# Patient Record
Sex: FEMALE | Race: WHITE | NOT HISPANIC OR LATINO | Employment: OTHER | URBAN - METROPOLITAN AREA
[De-identification: names, ages, dates, MRNs, and addresses within clinical notes are randomized per-mention and may not be internally consistent; named-entity substitution may affect disease eponyms.]

---

## 2019-12-12 ENCOUNTER — OFFICE VISIT (OUTPATIENT)
Dept: ORTHOPEDIC SURGERY | Facility: CLINIC | Age: 74
End: 2019-12-12

## 2019-12-12 VITALS — HEIGHT: 65 IN | WEIGHT: 159 LBS | OXYGEN SATURATION: 98 % | HEART RATE: 70 BPM | BODY MASS INDEX: 26.49 KG/M2

## 2019-12-12 DIAGNOSIS — M23.91 LOCKING KNEE, RIGHT: ICD-10-CM

## 2019-12-12 DIAGNOSIS — M17.11 PRIMARY OSTEOARTHRITIS OF RIGHT KNEE: ICD-10-CM

## 2019-12-12 DIAGNOSIS — M25.561 RIGHT KNEE PAIN, UNSPECIFIED CHRONICITY: Primary | ICD-10-CM

## 2019-12-12 PROCEDURE — 99203 OFFICE O/P NEW LOW 30 MIN: CPT | Performed by: ORTHOPAEDIC SURGERY

## 2019-12-12 PROCEDURE — 20610 DRAIN/INJ JOINT/BURSA W/O US: CPT | Performed by: ORTHOPAEDIC SURGERY

## 2019-12-12 RX ORDER — LIDOCAINE HYDROCHLORIDE 10 MG/ML
3 INJECTION, SOLUTION EPIDURAL; INFILTRATION; INTRACAUDAL; PERINEURAL
Status: COMPLETED | OUTPATIENT
Start: 2019-12-12 | End: 2019-12-12

## 2019-12-12 RX ORDER — TRAMADOL HYDROCHLORIDE 50 MG/1
50 TABLET ORAL EVERY 6 HOURS PRN
Qty: 30 TABLET | Refills: 1 | Status: SHIPPED | OUTPATIENT
Start: 2019-12-12 | End: 2020-02-27

## 2019-12-12 RX ORDER — TRIAMCINOLONE ACETONIDE 40 MG/ML
40 INJECTION, SUSPENSION INTRA-ARTICULAR; INTRAMUSCULAR
Status: COMPLETED | OUTPATIENT
Start: 2019-12-12 | End: 2019-12-12

## 2019-12-12 RX ORDER — DORZOLAMIDE HCL 20 MG/ML
SOLUTION/ DROPS OPHTHALMIC
COMMUNITY
Start: 2019-11-15

## 2019-12-12 RX ORDER — PRAVASTATIN SODIUM 20 MG
TABLET ORAL
COMMUNITY
Start: 2019-09-13

## 2019-12-12 RX ORDER — IMIQUIMOD 12.5 MG/.25G
CREAM TOPICAL
COMMUNITY
Start: 2019-12-05 | End: 2020-02-27

## 2019-12-12 RX ADMIN — TRIAMCINOLONE ACETONIDE 40 MG: 40 INJECTION, SUSPENSION INTRA-ARTICULAR; INTRAMUSCULAR at 16:35

## 2019-12-12 RX ADMIN — LIDOCAINE HYDROCHLORIDE 3 ML: 10 INJECTION, SOLUTION EPIDURAL; INFILTRATION; INTRACAUDAL; PERINEURAL at 16:35

## 2019-12-12 NOTE — PROGRESS NOTES
[facility] [place] [facility] Note    Subjective     Pain of the Right Knee (Yesterday she wore a compression sleeve on her knee and now today her knee has gotten worse. She is unable to really walk. Cold compress does not help with pain, but heat does./)      ANUEL    Ruthie Starkey is a 74 y.o. female.  Patient is well-known to me socially.  She has had difficulty with her right knee for quite some time but over the last month or so, she started physical therapy and this is really worsened her knee pain.  She has episodes of locking in the knee.  She is tried a little bit of over-the-counter ibuprofen but it seems to bother her stomach.  She has difficulty walking and this is worsened over the last day or 2.  She lives independently.  She does not use assistive devices to ambulate.  She is here with her daughters in law for further evaluation and treatment.    History reviewed. No pertinent past medical history.   Past Surgical History:   Procedure Laterality Date   • COLONOSCOPY     • SKIN CANCER EXCISION     • TONSILLECTOMY     • TUBAL ABDOMINAL LIGATION     • WISDOM TOOTH EXTRACTION        Family History   Problem Relation Age of Onset   • Hypertension Mother    • Heart disease Mother    • Osteoarthritis Father    • Heart disease Father      Social History     Socioeconomic History   • Marital status: Single     Spouse name: Not on file   • Number of children: Not on file   • Years of education: Not on file   • Highest education level: Not on file   Tobacco Use   • Smoking status: Former Smoker     Years: 20.00     Types: Cigarettes     Last attempt to quit: 2009     Years since quitting: 10.9   • Smokeless tobacco: Never Used   • Tobacco comment: socially    Substance and Sexual Activity   • Alcohol use: Yes     Frequency: Never     Comment: social   • Drug use: Never   • Sexual activity: Defer      Current Outpatient Medications on File Prior to Visit   Medication Sig Dispense Refill   • dorzolamide  "(TRUSOPT) 2 % ophthalmic solution      • imiquimod (ALDARA) 5 % cream      • pravastatin (PRAVACHOL) 20 MG tablet        No current facility-administered medications on file prior to visit.       No Known Allergies     The following portions of the patient's history were reviewed and updated as appropriate: allergies, current medications, past family history, past medical history, past social history, past surgical history and problem list.    Review of Systems   Constitutional: Negative.    HENT: Negative.    Eyes: Negative.    Respiratory: Negative.    Cardiovascular: Negative.    Gastrointestinal: Negative.    Endocrine: Negative.    Genitourinary: Negative.    Musculoskeletal: Positive for arthralgias and joint swelling.   Skin: Negative.    Allergic/Immunologic: Negative.    Neurological: Negative.    Hematological: Negative.    Psychiatric/Behavioral: Negative.         Objective      Physical Exam  Pulse 70   Ht 165.1 cm (65\")   Wt 72.1 kg (159 lb)   SpO2 98%   BMI 26.46 kg/m²     Body mass index is 26.46 kg/m².    General  Mental Status - alert  General Appearance - cooperative, well groomed, not in acute distress  Orientation - Oriented X3  Build & Nutrition - well developed and well nourished  Posture - normal posture  Gait - normal gait     Integumentary  Global Assessment  Examination of related systems reveals - no lymphadenopathy  Ears:  No abnormality  Nose:  No mucous drainage  General Characteristics  Overall examination of the patient's skin reveals - no rashes, no evidence of scars, no suspicious lesions and no bruises.  Color - normal coloration of skin.  Vascular: Brisk capillary refill in all extremities    Ortho Exam  Peripheral Vascular:    Upper Extremity:   Inspection:  Left--no cyanotic nail beds Right--no cyanotic nail beds   Bilateral:  Pink nail beds with brisk capillary refill   Palpation:  Bilateral radial pulse normal    Musculoskeletal:  Global Assessment:  Overall assessment of " Lower Extremity Muscle Strength and Tone:  Right quadriceps--5/5   Right hamstrings--5/5       Right tibialis anterior--5/5  Right gastroc-soleus--5/5  Right EHL --5/5    Lower Extremity:  Knee/Patella:  No digital clubbing or cyanosis.    Examination of left knee reveals:  Normal deep tendon reflexes, coordination, strength, tone, sensation.  No known fractures or deformities.    Inspection and Palpation:  Right knee:  Tenderness:  Over the lateral joint line and moderate severity  Effusion:  1+  Crepitus:  Positive  Pulses:  2+  Ecchymosis:  None  Warmth:  None     ROM:  Right:  Extension:5    Flexion:120  Left:  Extension:5    Flexion:120    Instability:    Right:  Lachman Test:  Negative, Varus stress test negative, Valgus stress test negative    Deformities/Malalignments/Discrepancies:    Left:  No deformity   Right:  Genu valgum    Functional Testing:  Jaun's test:  Negative  Patella grind test:  Positive  Q-angle:  normal    Imaging/Studies  Imaging Results (Last 24 Hours)     Procedure Component Value Units Date/Time    XR Knee 4+ View Right [[id number]] Resulted:  12/12/19 1657     Updated:  12/12/19 1726    Narrative:       Knee X-Ray    Indication: Pain    Study:  Upright AP, Skiers, Lateral, and Sunrise views of Right knee(s)    Comparison: None    Findings:    Patient appears to have mild to early moderate degenerative changes in the   medial compartment.    There are severe degenerative changes in the lateral compartment.    There are severe changes in the patellofemoral compartment.    Patient has overall valgus alignment.      Impression:   Severe lateral compartment and patellofemoral compartment degenerative   changes of the knee              Assessment:  1. Right knee pain, unspecified chronicity    2. Primary osteoarthritis of right knee    3. Locking knee, right        Plan:  1. Continue over-the-counter medication as needed for discomfort  2. Locking right knee in the face of end-stage  lateral patellofemoral compartment arthritis--we have a lengthy discussion with the patient and her 2 daughters in law this afternoon.  Given her acute exacerbation of her underlying end-stage arthritis, I recommended a corticosteroid injection.  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  We could add a Visco supplement as well if she is still not doing well.  We spoke at length about definitive management which would be arthroplasty.  Given her level of independence and motivation, anticipate that she would do well.  We will see how she does over the next few weeks.        Remington Jeong MD  12/12/19  6:30 PM

## 2019-12-12 NOTE — PROGRESS NOTES
Procedure   Large Joint Arthrocentesis: R knee  Date/Time: 12/12/2019 4:35 PM  Consent given by: patient  Site marked: site marked  Timeout: Immediately prior to procedure a time out was called to verify the correct patient, procedure, equipment, support staff and site/side marked as required   Supporting Documentation  Indications: pain   Procedure Details  Location: knee - R knee  Preparation: Patient was prepped and draped in the usual sterile fashion  Needle size: 25 G  Approach: anterolateral  Medications administered: 40 mg triamcinolone acetonide 40 MG/ML; 3 mL lidocaine PF 1% 1 %  Patient tolerance: patient tolerated the procedure well with no immediate complications

## 2020-01-09 ENCOUNTER — OFFICE VISIT (OUTPATIENT)
Dept: ORTHOPEDIC SURGERY | Facility: CLINIC | Age: 75
End: 2020-01-09

## 2020-01-09 ENCOUNTER — PREP FOR SURGERY (OUTPATIENT)
Dept: OTHER | Facility: HOSPITAL | Age: 75
End: 2020-01-09

## 2020-01-09 VITALS — HEART RATE: 67 BPM | OXYGEN SATURATION: 99 % | WEIGHT: 162.2 LBS | BODY MASS INDEX: 27.02 KG/M2 | HEIGHT: 65 IN

## 2020-01-09 DIAGNOSIS — M17.11 PRIMARY OSTEOARTHRITIS OF RIGHT KNEE: Primary | ICD-10-CM

## 2020-01-09 DIAGNOSIS — M23.91 LOCKING KNEE, RIGHT: ICD-10-CM

## 2020-01-09 PROCEDURE — 99213 OFFICE O/P EST LOW 20 MIN: CPT | Performed by: ORTHOPAEDIC SURGERY

## 2020-01-09 RX ORDER — ACETAMINOPHEN 500 MG
1000 TABLET ORAL ONCE
Status: CANCELLED | OUTPATIENT
Start: 2020-01-09 | End: 2020-01-09

## 2020-01-09 RX ORDER — OXYCODONE HCL 10 MG/1
10 TABLET, FILM COATED, EXTENDED RELEASE ORAL ONCE
Status: CANCELLED | OUTPATIENT
Start: 2020-01-09 | End: 2020-01-09

## 2020-01-09 RX ORDER — CEFAZOLIN SODIUM 2 G/100ML
2 INJECTION, SOLUTION INTRAVENOUS ONCE
Status: CANCELLED | OUTPATIENT
Start: 2020-01-09 | End: 2020-01-09

## 2020-01-09 NOTE — PROGRESS NOTES
"    [facility] [place] [facility] Note        Subjective     CC: Follow-up of the Right Knee (4 week f/u/Primary osteoarthritis of right knee )      HPI    Ruthie Starkey is a 74 y.o. female.  Patient is here today with her oldest son for follow-up of her right knee arthritis and locking.  She was injected with corticosteroid and has gotten good relief from the injection but knows that she is yet to get back to her baseline.  The knee is stiff and sore and it limits her.  She is here to discuss arthroplasty.      ROS:    Constiutional:Pt denies fever, chills, nausea, or vomiting.  MSK:as above        Objective      Past Medical History  History reviewed. No pertinent past medical history.      Physical Exam  Pulse 67   Ht 165.1 cm (65\")   Wt 73.6 kg (162 lb 3.2 oz)   SpO2 99%   Breastfeeding No   BMI 26.99 kg/m²     Body mass index is 26.99 kg/m².    Patient is well nourished and well developed.        Ortho Exam  Peripheral Vascular:    Upper Extremity:   Inspection:  Left--no cyanotic nail beds Right--no cyanotic nail beds   Bilateral:  Pink nail beds with brisk capillary refill   Palpation:  Bilateral radial pulse normal    Musculoskeletal:  Global Assessment:  Overall assessment of Lower Extremity Muscle Strength and Tone:  Right quadriceps--5/5   Right hamstrings--5/5       Right tibialis anterior--5/5  Right gastroc-soleus--5/5  Right EHL --5/5    Lower Extremity:  Knee/Patella:  No digital clubbing or cyanosis.    Examination of left knee reveals:  Normal deep tendon reflexes, coordination, strength, tone, sensation.  No known fractures or deformities.    Inspection and Palpation:  Right knee:  Tenderness:  Over the lateral joint line and moderate severity  Effusion:  1+  Crepitus:  Positive  Pulses:  2+  Ecchymosis:  None  Warmth:  None     ROM:  Right:  Extension:5    Flexion:120  Left:  Extension:5    Flexion:120    Instability:    Right:  Lachman Test:  Negative, Varus stress test negative, " Valgus stress test negative    Deformities/Malalignments/Discrepancies:    Left:  No deformity   Right:  Genu valgum    Functional Testing:  Jaun's test:  Negative  Patella grind test:  Positive  Q-angle:  normal    Imaging/Labs/EMG Reviewed:  Imaging Results (Last 24 Hours)     ** No results found for the last 24 hours. **          Assessment    Assessment:  1. Primary osteoarthritis of right knee    2. Locking knee, right        Plan:  1. Recommend over the counter anti-inflammatories for pain and/or swelling  2. Osteoarthritis right knee valgus pattern--we discussed the risk, benefits, and potential hazards of right total knee arthroplasty.  Patient is scheduled to get a tooth extracted sometime at the end of January and early February and we told her she needs to be clear of any infection and off antibiotics for couple of weeks prior to any surgery.  She also needs to be 12 weeks out from her last injection which would be sometime in the middle of March.  Patient would like to go to [facility] for about a week after surgery and this should be feasible given that she lives alone.  Patient will be sent for PAT.  Low risk for DVT given family history and personal history.  We will use aspirin for DVT prophylaxis.      Remington Jeong MD  01/09/20  9:29 AM

## 2020-02-27 ENCOUNTER — APPOINTMENT (OUTPATIENT)
Dept: PREADMISSION TESTING | Facility: HOSPITAL | Age: 75
End: 2020-02-27

## 2020-02-27 VITALS — BODY MASS INDEX: 27.07 KG/M2 | HEIGHT: 65 IN | WEIGHT: 162.48 LBS

## 2020-02-27 DIAGNOSIS — M17.11 PRIMARY OSTEOARTHRITIS OF RIGHT KNEE: ICD-10-CM

## 2020-02-27 DIAGNOSIS — Z01.89 LABORATORY TEST: Primary | ICD-10-CM

## 2020-02-27 LAB
ABO GROUP BLD: NORMAL
ANION GAP SERPL CALCULATED.3IONS-SCNC: 10 MMOL/L (ref 5–15)
BASOPHILS # BLD AUTO: 0.08 10*3/MM3 (ref 0–0.2)
BASOPHILS NFR BLD AUTO: 0.9 % (ref 0–1.5)
BILIRUB UR QL STRIP: NEGATIVE
BUN BLD-MCNC: 16 MG/DL (ref 8–23)
BUN/CREAT SERPL: 28.1 (ref 7–25)
CALCIUM SPEC-SCNC: 9.7 MG/DL (ref 8.6–10.5)
CHLORIDE SERPL-SCNC: 106 MMOL/L (ref 98–107)
CLARITY UR: CLEAR
CO2 SERPL-SCNC: 25 MMOL/L (ref 22–29)
COLOR UR: YELLOW
CREAT BLD-MCNC: 0.57 MG/DL (ref 0.57–1)
CRP SERPL-MCNC: 0.27 MG/DL (ref 0–0.5)
DEPRECATED RDW RBC AUTO: 42.5 FL (ref 37–54)
EOSINOPHIL # BLD AUTO: 0.24 10*3/MM3 (ref 0–0.4)
EOSINOPHIL NFR BLD AUTO: 2.7 % (ref 0.3–6.2)
ERYTHROCYTE [DISTWIDTH] IN BLOOD BY AUTOMATED COUNT: 12.8 % (ref 12.3–15.4)
ERYTHROCYTE [SEDIMENTATION RATE] IN BLOOD: 29 MM/HR (ref 0–30)
GFR SERPL CREATININE-BSD FRML MDRD: 104 ML/MIN/1.73
GLUCOSE BLD-MCNC: 83 MG/DL (ref 65–99)
GLUCOSE UR STRIP-MCNC: NEGATIVE MG/DL
HBA1C MFR BLD: 5.3 % (ref 4.8–5.6)
HCT VFR BLD AUTO: 43.1 % (ref 34–46.6)
HGB BLD-MCNC: 13.6 G/DL (ref 12–15.9)
HGB UR QL STRIP.AUTO: NEGATIVE
IMM GRANULOCYTES # BLD AUTO: 0.03 10*3/MM3 (ref 0–0.05)
IMM GRANULOCYTES NFR BLD AUTO: 0.3 % (ref 0–0.5)
KETONES UR QL STRIP: NEGATIVE
LEUKOCYTE ESTERASE UR QL STRIP.AUTO: NEGATIVE
LYMPHOCYTES # BLD AUTO: 2.39 10*3/MM3 (ref 0.7–3.1)
LYMPHOCYTES NFR BLD AUTO: 26.8 % (ref 19.6–45.3)
MCH RBC QN AUTO: 28.5 PG (ref 26.6–33)
MCHC RBC AUTO-ENTMCNC: 31.6 G/DL (ref 31.5–35.7)
MCV RBC AUTO: 90.2 FL (ref 79–97)
MONOCYTES # BLD AUTO: 0.74 10*3/MM3 (ref 0.1–0.9)
MONOCYTES NFR BLD AUTO: 8.3 % (ref 5–12)
NEUTROPHILS # BLD AUTO: 5.44 10*3/MM3 (ref 1.7–7)
NEUTROPHILS NFR BLD AUTO: 61 % (ref 42.7–76)
NITRITE UR QL STRIP: NEGATIVE
NRBC BLD AUTO-RTO: 0 /100 WBC (ref 0–0.2)
PH UR STRIP.AUTO: <=5 [PH] (ref 5–8)
PLATELET # BLD AUTO: 327 10*3/MM3 (ref 140–450)
PMV BLD AUTO: 11 FL (ref 6–12)
POTASSIUM BLD-SCNC: 4.4 MMOL/L (ref 3.5–5.2)
PROT UR QL STRIP: NEGATIVE
RBC # BLD AUTO: 4.78 10*6/MM3 (ref 3.77–5.28)
RH BLD: POSITIVE
SODIUM BLD-SCNC: 141 MMOL/L (ref 136–145)
SP GR UR STRIP: 1.01 (ref 1–1.03)
UROBILINOGEN UR QL STRIP: NORMAL
WBC NRBC COR # BLD: 8.92 10*3/MM3 (ref 3.4–10.8)

## 2020-02-27 PROCEDURE — 80048 BASIC METABOLIC PNL TOTAL CA: CPT | Performed by: ORTHOPAEDIC SURGERY

## 2020-02-27 PROCEDURE — 36415 COLL VENOUS BLD VENIPUNCTURE: CPT

## 2020-02-27 PROCEDURE — 86900 BLOOD TYPING SEROLOGIC ABO: CPT

## 2020-02-27 PROCEDURE — 86140 C-REACTIVE PROTEIN: CPT | Performed by: ORTHOPAEDIC SURGERY

## 2020-02-27 PROCEDURE — 83036 HEMOGLOBIN GLYCOSYLATED A1C: CPT | Performed by: ORTHOPAEDIC SURGERY

## 2020-02-27 PROCEDURE — 86901 BLOOD TYPING SEROLOGIC RH(D): CPT

## 2020-02-27 PROCEDURE — 85025 COMPLETE CBC W/AUTO DIFF WBC: CPT | Performed by: ORTHOPAEDIC SURGERY

## 2020-02-27 PROCEDURE — 81003 URINALYSIS AUTO W/O SCOPE: CPT | Performed by: ORTHOPAEDIC SURGERY

## 2020-02-27 PROCEDURE — 85652 RBC SED RATE AUTOMATED: CPT | Performed by: ORTHOPAEDIC SURGERY

## 2020-02-27 ASSESSMENT — KOOS JR
KOOS JR SCORE: 12
KOOS JR SCORE: 57.14

## 2020-03-05 DIAGNOSIS — M17.11 PRIMARY OSTEOARTHRITIS OF RIGHT KNEE: Primary | ICD-10-CM

## 2020-03-05 DIAGNOSIS — Z96.651 STATUS POST TOTAL RIGHT KNEE REPLACEMENT: ICD-10-CM

## 2020-03-10 ENCOUNTER — ANESTHESIA EVENT (OUTPATIENT)
Dept: PERIOP | Facility: HOSPITAL | Age: 75
End: 2020-03-10

## 2020-03-11 ENCOUNTER — APPOINTMENT (OUTPATIENT)
Dept: GENERAL RADIOLOGY | Facility: HOSPITAL | Age: 75
End: 2020-03-11

## 2020-03-11 ENCOUNTER — ANESTHESIA (OUTPATIENT)
Dept: PERIOP | Facility: HOSPITAL | Age: 75
End: 2020-03-11

## 2020-03-11 ENCOUNTER — HOSPITAL ENCOUNTER (OUTPATIENT)
Facility: HOSPITAL | Age: 75
Discharge: NURSING, REHAB OR LONG-TERM CARE | End: 2020-03-13
Attending: ORTHOPAEDIC SURGERY | Admitting: ORTHOPAEDIC SURGERY

## 2020-03-11 DIAGNOSIS — M17.11 PRIMARY OSTEOARTHRITIS OF RIGHT KNEE: ICD-10-CM

## 2020-03-11 DIAGNOSIS — Z74.09 IMPAIRED MOBILITY AND ADLS: Primary | ICD-10-CM

## 2020-03-11 DIAGNOSIS — Z96.651 STATUS POST TOTAL RIGHT KNEE REPLACEMENT: ICD-10-CM

## 2020-03-11 DIAGNOSIS — Z78.9 IMPAIRED MOBILITY AND ADLS: Primary | ICD-10-CM

## 2020-03-11 PROBLEM — E78.5 HYPERLIPIDEMIA: Status: ACTIVE | Noted: 2020-03-11

## 2020-03-11 PROCEDURE — C1776 JOINT DEVICE (IMPLANTABLE): HCPCS | Performed by: ORTHOPAEDIC SURGERY

## 2020-03-11 PROCEDURE — A9270 NON-COVERED ITEM OR SERVICE: HCPCS | Performed by: ORTHOPAEDIC SURGERY

## 2020-03-11 PROCEDURE — A9270 NON-COVERED ITEM OR SERVICE: HCPCS | Performed by: NURSE PRACTITIONER

## 2020-03-11 PROCEDURE — 63710000001 OXYCODONE 10 MG TABLET EXTENDED-RELEASE 12 HOUR: Performed by: ORTHOPAEDIC SURGERY

## 2020-03-11 PROCEDURE — 25810000003 SODIUM CHLORIDE 0.9 % WITH KCL 20 MEQ 20-0.9 MEQ/L-% SOLUTION: Performed by: ORTHOPAEDIC SURGERY

## 2020-03-11 PROCEDURE — 97116 GAIT TRAINING THERAPY: CPT

## 2020-03-11 PROCEDURE — 63710000001 MUPIROCIN 2 % OINTMENT: Performed by: ORTHOPAEDIC SURGERY

## 2020-03-11 PROCEDURE — 25010000002 ONDANSETRON PER 1 MG: Performed by: NURSE ANESTHETIST, CERTIFIED REGISTERED

## 2020-03-11 PROCEDURE — 25010000003 CEFAZOLIN IN DEXTROSE 2-4 GM/100ML-% SOLUTION: Performed by: ORTHOPAEDIC SURGERY

## 2020-03-11 PROCEDURE — 25010000002 KETOROLAC TROMETHAMINE PER 15 MG: Performed by: ORTHOPAEDIC SURGERY

## 2020-03-11 PROCEDURE — C1713 ANCHOR/SCREW BN/BN,TIS/BN: HCPCS | Performed by: ORTHOPAEDIC SURGERY

## 2020-03-11 PROCEDURE — 82962 GLUCOSE BLOOD TEST: CPT

## 2020-03-11 PROCEDURE — 63710000001 PRAVASTATIN 20 MG TABLET: Performed by: NURSE PRACTITIONER

## 2020-03-11 PROCEDURE — A9270 NON-COVERED ITEM OR SERVICE: HCPCS | Performed by: ANESTHESIOLOGY

## 2020-03-11 PROCEDURE — 97162 PT EVAL MOD COMPLEX 30 MIN: CPT

## 2020-03-11 PROCEDURE — 63710000001 OXYCODONE-ACETAMINOPHEN 5-325 MG TABLET: Performed by: ORTHOPAEDIC SURGERY

## 2020-03-11 PROCEDURE — 63710000001 POVIDONE-IODINE 10 % SOLUTION 30 ML BOTTLE: Performed by: ORTHOPAEDIC SURGERY

## 2020-03-11 PROCEDURE — 63710000001 ACETAMINOPHEN 500 MG TABLET: Performed by: ORTHOPAEDIC SURGERY

## 2020-03-11 PROCEDURE — 25010000002 HYDROMORPHONE PER 4 MG: Performed by: ANESTHESIOLOGY

## 2020-03-11 PROCEDURE — 25010000002 MORPHINE PER 10 MG: Performed by: ORTHOPAEDIC SURGERY

## 2020-03-11 PROCEDURE — 63710000001 DORZOLAMIDE 2 % SOLUTION 10 ML BOTTLE: Performed by: NURSE PRACTITIONER

## 2020-03-11 PROCEDURE — 25010000002 PROPOFOL 10 MG/ML EMULSION: Performed by: NURSE ANESTHETIST, CERTIFIED REGISTERED

## 2020-03-11 PROCEDURE — 25010000002 ROPIVACAINE PER 1 MG: Performed by: NURSE ANESTHETIST, CERTIFIED REGISTERED

## 2020-03-11 PROCEDURE — 73560 X-RAY EXAM OF KNEE 1 OR 2: CPT

## 2020-03-11 PROCEDURE — 27447 TOTAL KNEE ARTHROPLASTY: CPT | Performed by: PHYSICIAN ASSISTANT

## 2020-03-11 PROCEDURE — 27447 TOTAL KNEE ARTHROPLASTY: CPT | Performed by: ORTHOPAEDIC SURGERY

## 2020-03-11 PROCEDURE — 63710000001 FAMOTIDINE 20 MG TABLET: Performed by: ANESTHESIOLOGY

## 2020-03-11 PROCEDURE — 25010000002 ROPIVACAINE PER 1 MG: Performed by: ORTHOPAEDIC SURGERY

## 2020-03-11 DEVICE — CMT BONE R 1X40: Type: IMPLANTABLE DEVICE | Site: KNEE | Status: FUNCTIONAL

## 2020-03-11 DEVICE — IMPLANTABLE DEVICE: Type: IMPLANTABLE DEVICE | Site: KNEE | Status: FUNCTIONAL

## 2020-03-11 DEVICE — CAP TOTL KN CMT PREMIUM: Type: IMPLANTABLE DEVICE | Status: FUNCTIONAL

## 2020-03-11 DEVICE — STEM TIB/KN PERSONA CMT 5D SZE RT: Type: IMPLANTABLE DEVICE | Site: KNEE | Status: FUNCTIONAL

## 2020-03-11 DEVICE — COMP FEM/KN PERSONA CR CMT COCR STD SZ5 RT: Type: IMPLANTABLE DEVICE | Site: KNEE | Status: FUNCTIONAL

## 2020-03-11 DEVICE — CAP BEAR KN VE UPCHRG: Type: IMPLANTABLE DEVICE | Status: FUNCTIONAL

## 2020-03-11 RX ORDER — MEPERIDINE HYDROCHLORIDE 25 MG/ML
12.5 INJECTION INTRAMUSCULAR; INTRAVENOUS; SUBCUTANEOUS
Status: DISCONTINUED | OUTPATIENT
Start: 2020-03-11 | End: 2020-03-11 | Stop reason: HOSPADM

## 2020-03-11 RX ORDER — MAGNESIUM HYDROXIDE 1200 MG/15ML
LIQUID ORAL AS NEEDED
Status: DISCONTINUED | OUTPATIENT
Start: 2020-03-11 | End: 2020-03-11 | Stop reason: HOSPADM

## 2020-03-11 RX ORDER — DOCUSATE SODIUM 100 MG/1
100 CAPSULE, LIQUID FILLED ORAL 2 TIMES DAILY PRN
Status: DISCONTINUED | OUTPATIENT
Start: 2020-03-11 | End: 2020-03-13 | Stop reason: HOSPADM

## 2020-03-11 RX ORDER — LIDOCAINE HYDROCHLORIDE 10 MG/ML
0.5 INJECTION, SOLUTION EPIDURAL; INFILTRATION; INTRACAUDAL; PERINEURAL ONCE AS NEEDED
Status: COMPLETED | OUTPATIENT
Start: 2020-03-11 | End: 2020-03-11

## 2020-03-11 RX ORDER — CEFAZOLIN SODIUM 2 G/100ML
2 INJECTION, SOLUTION INTRAVENOUS EVERY 8 HOURS
Status: COMPLETED | OUTPATIENT
Start: 2020-03-11 | End: 2020-03-11

## 2020-03-11 RX ORDER — FENTANYL CITRATE 50 UG/ML
50 INJECTION, SOLUTION INTRAMUSCULAR; INTRAVENOUS
Status: DISCONTINUED | OUTPATIENT
Start: 2020-03-11 | End: 2020-03-11 | Stop reason: HOSPADM

## 2020-03-11 RX ORDER — PRAVASTATIN SODIUM 20 MG
20 TABLET ORAL NIGHTLY
Status: DISCONTINUED | OUTPATIENT
Start: 2020-03-11 | End: 2020-03-13 | Stop reason: HOSPADM

## 2020-03-11 RX ORDER — DORZOLAMIDE HCL 20 MG/ML
1 SOLUTION/ DROPS OPHTHALMIC 3 TIMES DAILY
Status: DISCONTINUED | OUTPATIENT
Start: 2020-03-11 | End: 2020-03-13 | Stop reason: HOSPADM

## 2020-03-11 RX ORDER — SODIUM CHLORIDE AND POTASSIUM CHLORIDE 150; 900 MG/100ML; MG/100ML
50 INJECTION, SOLUTION INTRAVENOUS CONTINUOUS
Status: DISCONTINUED | OUTPATIENT
Start: 2020-03-11 | End: 2020-03-13 | Stop reason: HOSPADM

## 2020-03-11 RX ORDER — SODIUM CHLORIDE 0.9 % (FLUSH) 0.9 %
3 SYRINGE (ML) INJECTION EVERY 12 HOURS SCHEDULED
Status: DISCONTINUED | OUTPATIENT
Start: 2020-03-11 | End: 2020-03-13 | Stop reason: HOSPADM

## 2020-03-11 RX ORDER — ACETAMINOPHEN 500 MG
1000 TABLET ORAL ONCE
Status: COMPLETED | OUTPATIENT
Start: 2020-03-11 | End: 2020-03-11

## 2020-03-11 RX ORDER — PROMETHAZINE HYDROCHLORIDE 25 MG/1
25 SUPPOSITORY RECTAL ONCE AS NEEDED
Status: DISCONTINUED | OUTPATIENT
Start: 2020-03-11 | End: 2020-03-11 | Stop reason: HOSPADM

## 2020-03-11 RX ORDER — LABETALOL HYDROCHLORIDE 5 MG/ML
10 INJECTION, SOLUTION INTRAVENOUS EVERY 4 HOURS PRN
Status: DISCONTINUED | OUTPATIENT
Start: 2020-03-11 | End: 2020-03-13 | Stop reason: HOSPADM

## 2020-03-11 RX ORDER — SODIUM CHLORIDE 0.9 % (FLUSH) 0.9 %
10 SYRINGE (ML) INJECTION AS NEEDED
Status: CANCELLED | OUTPATIENT
Start: 2020-03-11

## 2020-03-11 RX ORDER — HYDROMORPHONE HYDROCHLORIDE 1 MG/ML
0.5 INJECTION, SOLUTION INTRAMUSCULAR; INTRAVENOUS; SUBCUTANEOUS
Status: DISCONTINUED | OUTPATIENT
Start: 2020-03-11 | End: 2020-03-11 | Stop reason: HOSPADM

## 2020-03-11 RX ORDER — LABETALOL HYDROCHLORIDE 5 MG/ML
5 INJECTION, SOLUTION INTRAVENOUS
Status: DISCONTINUED | OUTPATIENT
Start: 2020-03-11 | End: 2020-03-11 | Stop reason: HOSPADM

## 2020-03-11 RX ORDER — PROMETHAZINE HYDROCHLORIDE 25 MG/ML
6.25 INJECTION, SOLUTION INTRAMUSCULAR; INTRAVENOUS ONCE AS NEEDED
Status: DISCONTINUED | OUTPATIENT
Start: 2020-03-11 | End: 2020-03-11 | Stop reason: HOSPADM

## 2020-03-11 RX ORDER — OXYCODONE HYDROCHLORIDE AND ACETAMINOPHEN 5; 325 MG/1; MG/1
1 TABLET ORAL EVERY 4 HOURS PRN
Status: DISCONTINUED | OUTPATIENT
Start: 2020-03-11 | End: 2020-03-12

## 2020-03-11 RX ORDER — SODIUM CHLORIDE 0.9 % (FLUSH) 0.9 %
10 SYRINGE (ML) INJECTION EVERY 12 HOURS SCHEDULED
Status: CANCELLED | OUTPATIENT
Start: 2020-03-11

## 2020-03-11 RX ORDER — OXYCODONE HCL 10 MG/1
10 TABLET, FILM COATED, EXTENDED RELEASE ORAL ONCE
Status: COMPLETED | OUTPATIENT
Start: 2020-03-11 | End: 2020-03-11

## 2020-03-11 RX ORDER — FAMOTIDINE 20 MG/1
20 TABLET, FILM COATED ORAL ONCE
Status: COMPLETED | OUTPATIENT
Start: 2020-03-11 | End: 2020-03-11

## 2020-03-11 RX ORDER — MORPHINE SULFATE 4 MG/ML
4 INJECTION, SOLUTION INTRAMUSCULAR; INTRAVENOUS
Status: DISCONTINUED | OUTPATIENT
Start: 2020-03-11 | End: 2020-03-13 | Stop reason: HOSPADM

## 2020-03-11 RX ORDER — FAMOTIDINE 10 MG/ML
20 INJECTION, SOLUTION INTRAVENOUS ONCE
Status: CANCELLED | OUTPATIENT
Start: 2020-03-11 | End: 2020-03-11

## 2020-03-11 RX ORDER — BUPIVACAINE HYDROCHLORIDE 2.5 MG/ML
INJECTION, SOLUTION EPIDURAL; INFILTRATION; INTRACAUDAL
Status: COMPLETED | OUTPATIENT
Start: 2020-03-11 | End: 2020-03-11

## 2020-03-11 RX ORDER — ONDANSETRON 2 MG/ML
INJECTION INTRAMUSCULAR; INTRAVENOUS AS NEEDED
Status: DISCONTINUED | OUTPATIENT
Start: 2020-03-11 | End: 2020-03-11 | Stop reason: SURG

## 2020-03-11 RX ORDER — IPRATROPIUM BROMIDE AND ALBUTEROL SULFATE 2.5; .5 MG/3ML; MG/3ML
3 SOLUTION RESPIRATORY (INHALATION) ONCE AS NEEDED
Status: DISCONTINUED | OUTPATIENT
Start: 2020-03-11 | End: 2020-03-11 | Stop reason: HOSPADM

## 2020-03-11 RX ORDER — ONDANSETRON 2 MG/ML
4 INJECTION INTRAMUSCULAR; INTRAVENOUS EVERY 6 HOURS PRN
Status: DISCONTINUED | OUTPATIENT
Start: 2020-03-11 | End: 2020-03-13 | Stop reason: HOSPADM

## 2020-03-11 RX ORDER — ONDANSETRON 4 MG/1
4 TABLET, FILM COATED ORAL EVERY 6 HOURS PRN
Status: DISCONTINUED | OUTPATIENT
Start: 2020-03-11 | End: 2020-03-13 | Stop reason: HOSPADM

## 2020-03-11 RX ORDER — PROPOFOL 10 MG/ML
VIAL (ML) INTRAVENOUS CONTINUOUS PRN
Status: DISCONTINUED | OUTPATIENT
Start: 2020-03-11 | End: 2020-03-11 | Stop reason: SURG

## 2020-03-11 RX ORDER — SODIUM CHLORIDE 0.9 % (FLUSH) 0.9 %
3-10 SYRINGE (ML) INJECTION AS NEEDED
Status: DISCONTINUED | OUTPATIENT
Start: 2020-03-11 | End: 2020-03-13 | Stop reason: HOSPADM

## 2020-03-11 RX ORDER — NALOXONE HCL 0.4 MG/ML
0.4 VIAL (ML) INJECTION
Status: DISCONTINUED | OUTPATIENT
Start: 2020-03-11 | End: 2020-03-13 | Stop reason: HOSPADM

## 2020-03-11 RX ORDER — BISACODYL 10 MG
10 SUPPOSITORY, RECTAL RECTAL DAILY PRN
Status: DISCONTINUED | OUTPATIENT
Start: 2020-03-11 | End: 2020-03-13 | Stop reason: HOSPADM

## 2020-03-11 RX ORDER — BUPIVACAINE HYDROCHLORIDE 5 MG/ML
INJECTION, SOLUTION PERINEURAL
Status: COMPLETED | OUTPATIENT
Start: 2020-03-11 | End: 2020-03-11

## 2020-03-11 RX ORDER — PROMETHAZINE HYDROCHLORIDE 25 MG/1
25 TABLET ORAL ONCE AS NEEDED
Status: DISCONTINUED | OUTPATIENT
Start: 2020-03-11 | End: 2020-03-11 | Stop reason: HOSPADM

## 2020-03-11 RX ORDER — CEFAZOLIN SODIUM 2 G/100ML
2 INJECTION, SOLUTION INTRAVENOUS ONCE
Status: COMPLETED | OUTPATIENT
Start: 2020-03-11 | End: 2020-03-11

## 2020-03-11 RX ORDER — SODIUM CHLORIDE, SODIUM LACTATE, POTASSIUM CHLORIDE, CALCIUM CHLORIDE 600; 310; 30; 20 MG/100ML; MG/100ML; MG/100ML; MG/100ML
9 INJECTION, SOLUTION INTRAVENOUS CONTINUOUS
Status: DISCONTINUED | OUTPATIENT
Start: 2020-03-11 | End: 2020-03-13 | Stop reason: HOSPADM

## 2020-03-11 RX ADMIN — LIDOCAINE HYDROCHLORIDE 0.2 ML: 10 INJECTION, SOLUTION EPIDURAL; INFILTRATION; INTRACAUDAL; PERINEURAL at 06:35

## 2020-03-11 RX ADMIN — FAMOTIDINE 20 MG: 20 TABLET ORAL at 06:50

## 2020-03-11 RX ADMIN — PROPOFOL 50 MCG/KG/MIN: 10 INJECTION, EMULSION INTRAVENOUS at 07:33

## 2020-03-11 RX ADMIN — MUPIROCIN 1 APPLICATION: 20 OINTMENT TOPICAL at 06:53

## 2020-03-11 RX ADMIN — TRANEXAMIC ACID 1000 MG: 100 INJECTION, SOLUTION INTRAVENOUS at 09:21

## 2020-03-11 RX ADMIN — BUPIVACAINE HYDROCHLORIDE 30 ML: 2.5 INJECTION, SOLUTION EPIDURAL; INFILTRATION; INTRACAUDAL; PERINEURAL at 07:48

## 2020-03-11 RX ADMIN — ACETAMINOPHEN 1000 MG: 500 TABLET ORAL at 06:50

## 2020-03-11 RX ADMIN — POTASSIUM CHLORIDE AND SODIUM CHLORIDE 50 ML/HR: 900; 150 INJECTION, SOLUTION INTRAVENOUS at 12:12

## 2020-03-11 RX ADMIN — HYDROMORPHONE HYDROCHLORIDE 0.5 MG: 1 INJECTION, SOLUTION INTRAMUSCULAR; INTRAVENOUS; SUBCUTANEOUS at 11:19

## 2020-03-11 RX ADMIN — PRAVASTATIN SODIUM 20 MG: 20 TABLET ORAL at 20:24

## 2020-03-11 RX ADMIN — CEFAZOLIN SODIUM 2 G: 2 INJECTION, SOLUTION INTRAVENOUS at 20:26

## 2020-03-11 RX ADMIN — OXYCODONE HYDROCHLORIDE AND ACETAMINOPHEN 1 TABLET: 5; 325 TABLET ORAL at 16:07

## 2020-03-11 RX ADMIN — DORZOLAMIDE HYDROCHLORIDE 1 DROP: 20 SOLUTION/ DROPS OPHTHALMIC at 20:25

## 2020-03-11 RX ADMIN — ROPIVACAINE HYDROCHLORIDE 10 ML/HR: 5 INJECTION, SOLUTION EPIDURAL; INFILTRATION; PERINEURAL at 10:33

## 2020-03-11 RX ADMIN — HYDROMORPHONE HYDROCHLORIDE 0.5 MG: 1 INJECTION, SOLUTION INTRAMUSCULAR; INTRAVENOUS; SUBCUTANEOUS at 11:00

## 2020-03-11 RX ADMIN — OXYCODONE HYDROCHLORIDE AND ACETAMINOPHEN 1 TABLET: 5; 325 TABLET ORAL at 20:51

## 2020-03-11 RX ADMIN — TRANEXAMIC ACID 1000 MG: 100 INJECTION, SOLUTION INTRAVENOUS at 07:37

## 2020-03-11 RX ADMIN — ONDANSETRON 4 MG: 2 INJECTION INTRAMUSCULAR; INTRAVENOUS at 10:13

## 2020-03-11 RX ADMIN — CEFAZOLIN SODIUM 2 G: 2 INJECTION, SOLUTION INTRAVENOUS at 14:54

## 2020-03-11 RX ADMIN — OXYCODONE HYDROCHLORIDE AND ACETAMINOPHEN 1 TABLET: 5; 325 TABLET ORAL at 12:12

## 2020-03-11 RX ADMIN — SODIUM CHLORIDE, POTASSIUM CHLORIDE, SODIUM LACTATE AND CALCIUM CHLORIDE 9 ML/HR: 600; 310; 30; 20 INJECTION, SOLUTION INTRAVENOUS at 06:35

## 2020-03-11 RX ADMIN — SODIUM CHLORIDE, POTASSIUM CHLORIDE, SODIUM LACTATE AND CALCIUM CHLORIDE: 600; 310; 30; 20 INJECTION, SOLUTION INTRAVENOUS at 09:38

## 2020-03-11 RX ADMIN — CEFAZOLIN SODIUM 2 G: 2 INJECTION, SOLUTION INTRAVENOUS at 07:21

## 2020-03-11 RX ADMIN — OXYCODONE HYDROCHLORIDE 10 MG: 10 TABLET, FILM COATED, EXTENDED RELEASE ORAL at 06:50

## 2020-03-11 RX ADMIN — BUPIVACAINE HYDROCHLORIDE 1.6 ML: 5 INJECTION, SOLUTION PERINEURAL at 07:31

## 2020-03-11 NOTE — ANESTHESIA PROCEDURE NOTES
Peripheral Block      Patient reassessed immediately prior to procedure    Patient location during procedure: post-op  Reason for block: at surgeon's request and post-op pain management  Performed by  CRNA: Mishel Hicks CRNA  Preanesthetic Checklist  Completed: patient identified, site marked, surgical consent, pre-op evaluation, timeout performed, IV checked, risks and benefits discussed and monitors and equipment checked  Prep:  Pt Position: supine  Sterile barriers:cap, gloves, mask and sterile barriers  Prep: ChloraPrep  Patient monitoring: blood pressure monitoring, continuous pulse oximetry and EKG  Procedure  Performed under: spinal  Guidance:ultrasound guided  Images:still images obtained, printed/placed on chart    Laterality:right  Block Type:adductor canal block  Injection Technique:catheter  Needle Type:Tuohy and echogenic  Needle Gauge:18 G  Resistance on Injection: none  Catheter Size:20 G (20g)  Cath Depth at skin: 9 cm    Medications Used: bupivacaine PF (MARCAINE) 0.25 % injection, 30 mL      Post Assessment  Injection Assessment: negative aspiration for heme, incremental injection and no paresthesia on injection  Patient Tolerance:comfortable throughout block  Complications:no  Additional Notes  Procedure:             The pt was placed in the Supine position.  The Insertion site was  prepped and Draped in sterile fashion.  The pt was anesthetized with  IV Sedation( see meds).  Skin and cutaneous tissue was infiltrated and anesthetized with 1% Lidocaine 3 mls via a 25g needle.  A BBraun 4 inch 18g echogenic needle was then  inserted approximately midline, mid-thigh and advanced In-plane with Ultrasound guidance.  Normal Saline PSF was utilized for hydrodissection of tissue.  The Vastus medialis and Sartorius muscle where visualized and the needle tip was placed in the adductor canal,  lateral to the femoral artery.  LA injection spread was visualized, injection was incremental 1-5ml,  injection pressure was normal or little, no intraneural injection, no vascular injection.  LA dose was injected thru the needle(see dose above).  A BBraun 20g wire stylet catheter was placed via the needle with ultrasound visualization and confirmation with NS fluid bolus. The labeled Catheter was then secured to skin at insertion site with skin afix and steristrips to curled catheter and CHG transparent dressing.  Thank you.

## 2020-03-11 NOTE — ANESTHESIA PREPROCEDURE EVALUATION
Anesthesia Evaluation     NPO Solid Status: > 8 hours  NPO Liquid Status: > 4 hours           Airway   Mallampati: II  TM distance: >3 FB  Neck ROM: full  No difficulty expected  Dental      Pulmonary    (+) a smoker Former,   (-) asthma  Cardiovascular     Rhythm: regular  Rate: normal    (-) pacemaker, hypertension, angina, murmur, cardiac stents, CABG      Neuro/Psych  (-) seizures, TIA  GI/Hepatic/Renal/Endo    (-) liver disease, no renal disease, diabetes    Musculoskeletal     Abdominal    Substance History      OB/GYN          Other                        Anesthesia Plan    ASA 2   (Spinal with sedation  Adductor canal cath in RR)  intravenous induction       Plan discussed with CRNA.

## 2020-03-11 NOTE — ANESTHESIA PROCEDURE NOTES
Spinal Block      Patient reassessed immediately prior to procedure    Patient location during procedure: OR  Indication:at surgeon's request  Performed By  CRNA: Mishel Hicks CRNA  Preanesthetic Checklist  Completed: patient identified, site marked, surgical consent, pre-op evaluation, timeout performed, IV checked, risks and benefits discussed and monitors and equipment checked  Spinal Block Prep:  Patient Position:sitting  Sterile Tech:cap, gloves, sterile barriers and mask  Prep:Chloraprep  Patient Monitoring:blood pressure monitoring, continuous pulse oximetry and EKG  Spinal Block Procedure  Approach:midline  Guidance:landmark technique and palpation technique  Location:L4-L5  Needle Type:Quincke  Needle Gauge:22 G  Placement of Spinal needle event:cerebrospinal fluid aspirated  Paresthesia: no  Fluid Appearance:clear  Medications: bupivacaine (MARCAINE) 0.5 % injection, 1.6 mL  Med Administered at 3/11/2020 7:31 AM   Post Assessment  Patient Tolerance:patient tolerated the procedure well with no apparent complications  Complications no  Additional Notes  Procedure:  Pt assisted to sitting position, with legs in position of comfort over side of bed.  Pt. instructed in optimal spine presentation, the spine was prepped/ Draped and the skin at insertion site was anesthetized with 1% Lidocaine 2 ml.  The spinal needle was then advanced until CSF flow was obtained and LA was injected:

## 2020-03-11 NOTE — ANESTHESIA POSTPROCEDURE EVALUATION
Patient: Ruthie Starkey    Procedure Summary     Date:  03/11/20 [facility] / [facility]    Anesthesia Start:  0721 Anesthesia Stop:      Procedure:  TOTAL KNEE ARTHROPLASTY RIGHT (Right Knee) Diagnosis:       Primary osteoarthritis of right knee      (Primary osteoarthritis of right knee [M17.11])    Surgeon:  Remington Jeong MD Provider:  Deshawn Rollins MD    Anesthesia Type:  Not recorded ASA Status:  2          Anesthesia Type: No value filed.    Vitals  Vitals Value Taken Time   /57 3/11/2020 10:27 AM   Temp 97.4 °F (36.3 °C) 3/11/2020 10:27 AM   Pulse 61 3/11/2020 10:27 AM   Resp 16 3/11/2020 10:27 AM   SpO2 100 % 3/11/2020 10:27 AM           Post Anesthesia Care and Evaluation    Patient location during evaluation: PACU  Patient participation: complete - patient participated  Level of consciousness: awake and alert  Pain score: 0  Pain management: adequate  Airway patency: patent  Anesthetic complications: No anesthetic complications  PONV Status: none  Cardiovascular status: hemodynamically stable and acceptable  Respiratory status: nonlabored ventilation, acceptable and nasal cannula  Hydration status: acceptable

## 2020-03-12 LAB
ANION GAP SERPL CALCULATED.3IONS-SCNC: 8 MMOL/L (ref 5–15)
BASOPHILS # BLD AUTO: 0.06 10*3/MM3 (ref 0–0.2)
BASOPHILS NFR BLD AUTO: 0.6 % (ref 0–1.5)
BUN BLD-MCNC: 15 MG/DL (ref 8–23)
BUN/CREAT SERPL: 27.8 (ref 7–25)
CALCIUM SPEC-SCNC: 8.7 MG/DL (ref 8.6–10.5)
CHLORIDE SERPL-SCNC: 104 MMOL/L (ref 98–107)
CO2 SERPL-SCNC: 25 MMOL/L (ref 22–29)
CREAT BLD-MCNC: 0.54 MG/DL (ref 0.57–1)
DEPRECATED RDW RBC AUTO: 43.2 FL (ref 37–54)
EOSINOPHIL # BLD AUTO: 0.01 10*3/MM3 (ref 0–0.4)
EOSINOPHIL NFR BLD AUTO: 0.1 % (ref 0.3–6.2)
ERYTHROCYTE [DISTWIDTH] IN BLOOD BY AUTOMATED COUNT: 13.1 % (ref 12.3–15.4)
GFR SERPL CREATININE-BSD FRML MDRD: 110 ML/MIN/1.73
GLUCOSE BLD-MCNC: 126 MG/DL (ref 65–99)
HCT VFR BLD AUTO: 33.5 % (ref 34–46.6)
HGB BLD-MCNC: 10.3 G/DL (ref 12–15.9)
IMM GRANULOCYTES # BLD AUTO: 0.05 10*3/MM3 (ref 0–0.05)
IMM GRANULOCYTES NFR BLD AUTO: 0.5 % (ref 0–0.5)
LYMPHOCYTES # BLD AUTO: 1.04 10*3/MM3 (ref 0.7–3.1)
LYMPHOCYTES NFR BLD AUTO: 9.5 % (ref 19.6–45.3)
MCH RBC QN AUTO: 28 PG (ref 26.6–33)
MCHC RBC AUTO-ENTMCNC: 30.7 G/DL (ref 31.5–35.7)
MCV RBC AUTO: 91 FL (ref 79–97)
MONOCYTES # BLD AUTO: 1.46 10*3/MM3 (ref 0.1–0.9)
MONOCYTES NFR BLD AUTO: 13.4 % (ref 5–12)
NEUTROPHILS # BLD AUTO: 8.28 10*3/MM3 (ref 1.7–7)
NEUTROPHILS NFR BLD AUTO: 75.9 % (ref 42.7–76)
NRBC BLD AUTO-RTO: 0 /100 WBC (ref 0–0.2)
PLATELET # BLD AUTO: 252 10*3/MM3 (ref 140–450)
PMV BLD AUTO: 11 FL (ref 6–12)
POTASSIUM BLD-SCNC: 4.2 MMOL/L (ref 3.5–5.2)
RBC # BLD AUTO: 3.68 10*6/MM3 (ref 3.77–5.28)
SODIUM BLD-SCNC: 137 MMOL/L (ref 136–145)
WBC NRBC COR # BLD: 10.9 10*3/MM3 (ref 3.4–10.8)

## 2020-03-12 PROCEDURE — 97530 THERAPEUTIC ACTIVITIES: CPT

## 2020-03-12 PROCEDURE — 97116 GAIT TRAINING THERAPY: CPT

## 2020-03-12 PROCEDURE — 63710000001 OXYCODONE-ACETAMINOPHEN 5-325 MG TABLET: Performed by: INTERNAL MEDICINE

## 2020-03-12 PROCEDURE — 97166 OT EVAL MOD COMPLEX 45 MIN: CPT

## 2020-03-12 PROCEDURE — 97535 SELF CARE MNGMENT TRAINING: CPT

## 2020-03-12 PROCEDURE — A9270 NON-COVERED ITEM OR SERVICE: HCPCS | Performed by: ORTHOPAEDIC SURGERY

## 2020-03-12 PROCEDURE — 63710000001 PRAVASTATIN 20 MG TABLET: Performed by: NURSE PRACTITIONER

## 2020-03-12 PROCEDURE — 63710000001 DOCUSATE SODIUM 100 MG CAPSULE: Performed by: ORTHOPAEDIC SURGERY

## 2020-03-12 PROCEDURE — 85025 COMPLETE CBC W/AUTO DIFF WBC: CPT | Performed by: ORTHOPAEDIC SURGERY

## 2020-03-12 PROCEDURE — A9270 NON-COVERED ITEM OR SERVICE: HCPCS | Performed by: INTERNAL MEDICINE

## 2020-03-12 PROCEDURE — 99024 POSTOP FOLLOW-UP VISIT: CPT | Performed by: ORTHOPAEDIC SURGERY

## 2020-03-12 PROCEDURE — 63710000001 MAGNESIUM HYDROXIDE 2400 MG/10ML SUSPENSION: Performed by: ORTHOPAEDIC SURGERY

## 2020-03-12 PROCEDURE — 25010000002 MORPHINE PER 10 MG: Performed by: ORTHOPAEDIC SURGERY

## 2020-03-12 PROCEDURE — 97110 THERAPEUTIC EXERCISES: CPT

## 2020-03-12 PROCEDURE — 63710000001 OXYCODONE-ACETAMINOPHEN 5-325 MG TABLET: Performed by: ORTHOPAEDIC SURGERY

## 2020-03-12 PROCEDURE — 63710000001 OXYCODONE 10 MG TABLET EXTENDED-RELEASE 12 HOUR: Performed by: ORTHOPAEDIC SURGERY

## 2020-03-12 PROCEDURE — A9270 NON-COVERED ITEM OR SERVICE: HCPCS | Performed by: NURSE PRACTITIONER

## 2020-03-12 PROCEDURE — 63710000001 APIXABAN 2.5 MG TABLET: Performed by: ORTHOPAEDIC SURGERY

## 2020-03-12 PROCEDURE — 80048 BASIC METABOLIC PNL TOTAL CA: CPT | Performed by: ORTHOPAEDIC SURGERY

## 2020-03-12 RX ORDER — OXYCODONE HCL 10 MG/1
10 TABLET, FILM COATED, EXTENDED RELEASE ORAL EVERY 12 HOURS SCHEDULED
Status: DISCONTINUED | OUTPATIENT
Start: 2020-03-12 | End: 2020-03-13

## 2020-03-12 RX ORDER — HYDROMORPHONE HYDROCHLORIDE 1 MG/ML
0.5 INJECTION, SOLUTION INTRAMUSCULAR; INTRAVENOUS; SUBCUTANEOUS EVERY 4 HOURS PRN
Status: DISCONTINUED | OUTPATIENT
Start: 2020-03-12 | End: 2020-03-13 | Stop reason: HOSPADM

## 2020-03-12 RX ORDER — OXYCODONE HYDROCHLORIDE AND ACETAMINOPHEN 5; 325 MG/1; MG/1
2 TABLET ORAL EVERY 4 HOURS PRN
Status: DISCONTINUED | OUTPATIENT
Start: 2020-03-12 | End: 2020-03-13 | Stop reason: HOSPADM

## 2020-03-12 RX ADMIN — OXYCODONE HYDROCHLORIDE AND ACETAMINOPHEN 1 TABLET: 5; 325 TABLET ORAL at 00:34

## 2020-03-12 RX ADMIN — APIXABAN 2.5 MG: 2.5 TABLET, FILM COATED ORAL at 07:51

## 2020-03-12 RX ADMIN — MORPHINE SULFATE 4 MG: 4 INJECTION, SOLUTION INTRAMUSCULAR; INTRAVENOUS at 03:20

## 2020-03-12 RX ADMIN — DORZOLAMIDE HYDROCHLORIDE 1 DROP: 20 SOLUTION/ DROPS OPHTHALMIC at 21:07

## 2020-03-12 RX ADMIN — OXYCODONE HYDROCHLORIDE AND ACETAMINOPHEN 2 TABLET: 5; 325 TABLET ORAL at 22:33

## 2020-03-12 RX ADMIN — DOCUSATE SODIUM 100 MG: 100 CAPSULE, LIQUID FILLED ORAL at 07:52

## 2020-03-12 RX ADMIN — SODIUM CHLORIDE, PRESERVATIVE FREE 3 ML: 5 INJECTION INTRAVENOUS at 21:07

## 2020-03-12 RX ADMIN — PRAVASTATIN SODIUM 20 MG: 20 TABLET ORAL at 21:07

## 2020-03-12 RX ADMIN — OXYCODONE HYDROCHLORIDE AND ACETAMINOPHEN 1 TABLET: 5; 325 TABLET ORAL at 03:54

## 2020-03-12 RX ADMIN — DORZOLAMIDE HYDROCHLORIDE 1 DROP: 20 SOLUTION/ DROPS OPHTHALMIC at 16:05

## 2020-03-12 RX ADMIN — SODIUM CHLORIDE, PRESERVATIVE FREE 3 ML: 5 INJECTION INTRAVENOUS at 07:51

## 2020-03-12 RX ADMIN — APIXABAN 2.5 MG: 2.5 TABLET, FILM COATED ORAL at 21:07

## 2020-03-12 RX ADMIN — OXYCODONE HYDROCHLORIDE 10 MG: 10 TABLET, FILM COATED, EXTENDED RELEASE ORAL at 08:17

## 2020-03-12 RX ADMIN — OXYCODONE HYDROCHLORIDE 10 MG: 10 TABLET, FILM COATED, EXTENDED RELEASE ORAL at 21:07

## 2020-03-12 RX ADMIN — OXYCODONE HYDROCHLORIDE AND ACETAMINOPHEN 1 TABLET: 5; 325 TABLET ORAL at 17:41

## 2020-03-12 RX ADMIN — DORZOLAMIDE HYDROCHLORIDE 1 DROP: 20 SOLUTION/ DROPS OPHTHALMIC at 07:52

## 2020-03-12 RX ADMIN — OXYCODONE HYDROCHLORIDE AND ACETAMINOPHEN 1 TABLET: 5; 325 TABLET ORAL at 11:58

## 2020-03-12 RX ADMIN — OXYCODONE HYDROCHLORIDE AND ACETAMINOPHEN 1 TABLET: 5; 325 TABLET ORAL at 16:05

## 2020-03-12 RX ADMIN — MAGNESIUM HYDROXIDE 10 ML: 2400 SUSPENSION ORAL at 07:51

## 2020-03-13 VITALS
RESPIRATION RATE: 18 BRPM | HEART RATE: 82 BPM | DIASTOLIC BLOOD PRESSURE: 60 MMHG | OXYGEN SATURATION: 96 % | SYSTOLIC BLOOD PRESSURE: 134 MMHG | TEMPERATURE: 98.2 F

## 2020-03-13 PROBLEM — D62 ACUTE BLOOD LOSS ANEMIA: Status: ACTIVE | Noted: 2020-03-13

## 2020-03-13 PROBLEM — G89.18 ACUTE POSTOPERATIVE PAIN: Status: ACTIVE | Noted: 2020-03-13

## 2020-03-13 PROBLEM — D72.829 LEUKOCYTOSIS: Status: ACTIVE | Noted: 2020-03-13

## 2020-03-13 LAB — GLUCOSE BLDC GLUCOMTR-MCNC: 94 MG/DL (ref 70–130)

## 2020-03-13 PROCEDURE — 63710000001 MAGNESIUM HYDROXIDE 2400 MG/10ML SUSPENSION: Performed by: ORTHOPAEDIC SURGERY

## 2020-03-13 PROCEDURE — 63710000001 OXYCODONE-ACETAMINOPHEN 5-325 MG TABLET: Performed by: INTERNAL MEDICINE

## 2020-03-13 PROCEDURE — 97116 GAIT TRAINING THERAPY: CPT

## 2020-03-13 PROCEDURE — A9270 NON-COVERED ITEM OR SERVICE: HCPCS | Performed by: ORTHOPAEDIC SURGERY

## 2020-03-13 PROCEDURE — A9270 NON-COVERED ITEM OR SERVICE: HCPCS | Performed by: INTERNAL MEDICINE

## 2020-03-13 PROCEDURE — 63710000001 DOCUSATE SODIUM 100 MG CAPSULE: Performed by: ORTHOPAEDIC SURGERY

## 2020-03-13 PROCEDURE — 97110 THERAPEUTIC EXERCISES: CPT

## 2020-03-13 PROCEDURE — 63710000001 BISACODYL 10 MG SUPPOSITORY: Performed by: ORTHOPAEDIC SURGERY

## 2020-03-13 PROCEDURE — 63710000001 APIXABAN 2.5 MG TABLET: Performed by: ORTHOPAEDIC SURGERY

## 2020-03-13 RX ORDER — ASPIRIN 325 MG
325 TABLET, DELAYED RELEASE (ENTERIC COATED) ORAL DAILY
Start: 2020-03-13 | End: 2020-06-18

## 2020-03-13 RX ORDER — OXYCODONE HYDROCHLORIDE AND ACETAMINOPHEN 5; 325 MG/1; MG/1
2 TABLET ORAL EVERY 4 HOURS PRN
Start: 2020-03-13 | End: 2020-03-17

## 2020-03-13 RX ORDER — PSEUDOEPHEDRINE HCL 30 MG
100 TABLET ORAL 2 TIMES DAILY PRN
Start: 2020-03-13 | End: 2020-06-18

## 2020-03-13 RX ADMIN — OXYCODONE HYDROCHLORIDE AND ACETAMINOPHEN 2 TABLET: 5; 325 TABLET ORAL at 12:31

## 2020-03-13 RX ADMIN — MAGNESIUM HYDROXIDE 10 ML: 2400 SUSPENSION ORAL at 09:56

## 2020-03-13 RX ADMIN — DORZOLAMIDE HYDROCHLORIDE 1 DROP: 20 SOLUTION/ DROPS OPHTHALMIC at 07:41

## 2020-03-13 RX ADMIN — OXYCODONE HYDROCHLORIDE AND ACETAMINOPHEN 2 TABLET: 5; 325 TABLET ORAL at 03:36

## 2020-03-13 RX ADMIN — BISACODYL 10 MG: 10 SUPPOSITORY RECTAL at 08:27

## 2020-03-13 RX ADMIN — OXYCODONE HYDROCHLORIDE AND ACETAMINOPHEN 2 TABLET: 5; 325 TABLET ORAL at 07:41

## 2020-03-13 RX ADMIN — SODIUM CHLORIDE, PRESERVATIVE FREE 3 ML: 5 INJECTION INTRAVENOUS at 07:42

## 2020-03-13 RX ADMIN — APIXABAN 2.5 MG: 2.5 TABLET, FILM COATED ORAL at 07:41

## 2020-03-13 RX ADMIN — DOCUSATE SODIUM 100 MG: 100 CAPSULE, LIQUID FILLED ORAL at 09:56

## 2020-03-17 DIAGNOSIS — Z96.651 STATUS POST TOTAL RIGHT KNEE REPLACEMENT: Primary | ICD-10-CM

## 2020-03-17 RX ORDER — OXYCODONE HYDROCHLORIDE 5 MG/1
10 TABLET ORAL EVERY 6 HOURS PRN
Qty: 40 TABLET | Refills: 0 | Status: SHIPPED | OUTPATIENT
Start: 2020-03-17 | End: 2020-04-07

## 2020-04-07 ENCOUNTER — OFFICE VISIT (OUTPATIENT)
Dept: ORTHOPEDIC SURGERY | Facility: CLINIC | Age: 75
End: 2020-04-07

## 2020-04-07 DIAGNOSIS — Z96.651 STATUS POST TOTAL RIGHT KNEE REPLACEMENT: Primary | ICD-10-CM

## 2020-04-07 PROCEDURE — 99024 POSTOP FOLLOW-UP VISIT: CPT | Performed by: ORTHOPAEDIC SURGERY

## 2020-04-07 NOTE — PROGRESS NOTES
[facility] [place] Orthopaedic Surgery Telephone/Video Visit Note        Subjective     CC: Post-op (3 weeks status post total knee replacement, right 03/11/20)    You have chosen to receive care through a telephone visit today. Do you consent to use a telephone visit for your medical care today? Yes    HPI    Ruthie Starkey is a 74 y.o. female.  Patient is 3 weeks out from right total knee arthroplasty on 3/11/2020.  She is seeing Emelina with [facility].  Patient sent me pictures of her knee via text message and they looks very good overall.  She tells me her range of motion is 0 to 95 degrees per Emelina.      ROS:    Constiutional:Pt denies fever, chills, nausea, or vomiting.  MSK:as above        Objective      Past Medical History  Past Medical History:   Diagnosis Date   • Arthritis    • Glaucoma    • Hyperlipidemia          Telephone Visit Notes:  Incision healing per text message photo  Minimal calf soreness or tenderness  Patient is on aspirin for DVT prophylaxis  She is awaiting to hear from [facility] regarding further physical therapy.  Patient does not want to leave home and I believe that given the current environment, I would recommend she continue with home health PT until she gets her motion is good if she can get it.  We will touch base in about 3 to 4 weeks to reassess how she is doing overall.  We will also call [facility] to facilitate her PT.        Assessment    Assessment:  1. Status post total right knee replacement        Plan:  1. Recommend over the counter anti-inflammatories for pain and/or swelling  2. Continue home health physical therapy  3. Repeat telehealth visit in about 3 to 4 weeks    This visit has been rescheduled as a phone visit to comply with patient safety concerns in accordance with CDC recommendations. Total time of discussion was 8 minutes.      Remington Jeong MD  04/07/20  09:59

## 2020-05-05 ENCOUNTER — OFFICE VISIT (OUTPATIENT)
Dept: ORTHOPEDIC SURGERY | Facility: CLINIC | Age: 75
End: 2020-05-05

## 2020-05-05 DIAGNOSIS — Z96.651 STATUS POST TOTAL RIGHT KNEE REPLACEMENT: Primary | ICD-10-CM

## 2020-05-05 PROCEDURE — 99024 POSTOP FOLLOW-UP VISIT: CPT | Performed by: ORTHOPAEDIC SURGERY

## 2020-05-05 NOTE — PROGRESS NOTES
[facility] [place] [facility] [place]        Subjective     Post-op (4 week follow up; 8 weeks status post total knee replacement, right 03/11/20)     You have chosen to receive care through a telephone visit. Do you consent to use a telephone visit for your medical care today? Yes    HPI    Ruthie Starkey is a 74 y.o. female.  Patient is contacted by telephone for follow-up after her right knee replacement on 3/11/2020.  She is getting home health once a week.  He is not interested in outpatient physical therapy.  She is having little bit of difficulty with stairs.          Objective      Telephone notes:  Patient's range of motion is 0-110  She is having difficulty going downstairs more than upstairs  She has yet to try kneeling or squatting  She does not want to do outpatient physical therapy secondary to COVID-19      Assessment    Assessment:  1. Status post total right knee replacement        Plan:  1. Status post right knee replacement--patient is 8 weeks out.  She is doing very well overall.  I have challenged her to try and get to 120 degrees of flexion.  I will put in another referral to [facility] health physical therapy to see the patient twice a week and work on terminal flexion as much as possible along with strengthening.    This visit has been rescheduled as a phone visit to comply with patient safety concerns in accordance with CDC recommendations. Total time of discussion was 7 minutes.      Remington Jeong MD  05/05/20  12:39

## 2020-06-08 DIAGNOSIS — Z96.651 STATUS POST TOTAL RIGHT KNEE REPLACEMENT: Primary | ICD-10-CM

## 2020-06-18 ENCOUNTER — OFFICE VISIT (OUTPATIENT)
Dept: ORTHOPEDIC SURGERY | Facility: CLINIC | Age: 75
End: 2020-06-18

## 2020-06-18 VITALS — HEIGHT: 65 IN | HEART RATE: 72 BPM | BODY MASS INDEX: 25.12 KG/M2 | WEIGHT: 150.8 LBS | OXYGEN SATURATION: 98 %

## 2020-06-18 DIAGNOSIS — M25.511 RIGHT SHOULDER PAIN, UNSPECIFIED CHRONICITY: Primary | ICD-10-CM

## 2020-06-18 DIAGNOSIS — IMO0002 DISORDER OF ROTATOR CUFF SYNDROME OF RIGHT SHOULDER AND ALLIED DISORDER: ICD-10-CM

## 2020-06-18 DIAGNOSIS — Z96.651 STATUS POST TOTAL RIGHT KNEE REPLACEMENT: ICD-10-CM

## 2020-06-18 DIAGNOSIS — M19.011 ARTHRITIS OF RIGHT ACROMIOCLAVICULAR JOINT: ICD-10-CM

## 2020-06-18 PROCEDURE — 99214 OFFICE O/P EST MOD 30 MIN: CPT | Performed by: ORTHOPAEDIC SURGERY

## 2020-06-18 PROCEDURE — 20610 DRAIN/INJ JOINT/BURSA W/O US: CPT | Performed by: ORTHOPAEDIC SURGERY

## 2020-06-18 RX ORDER — LIDOCAINE HYDROCHLORIDE 10 MG/ML
3 INJECTION, SOLUTION EPIDURAL; INFILTRATION; INTRACAUDAL; PERINEURAL
Status: COMPLETED | OUTPATIENT
Start: 2020-06-18 | End: 2020-06-18

## 2020-06-18 RX ORDER — TRIAMCINOLONE ACETONIDE 40 MG/ML
40 INJECTION, SUSPENSION INTRA-ARTICULAR; INTRAMUSCULAR
Status: COMPLETED | OUTPATIENT
Start: 2020-06-18 | End: 2020-06-18

## 2020-06-18 RX ADMIN — LIDOCAINE HYDROCHLORIDE 3 ML: 10 INJECTION, SOLUTION EPIDURAL; INFILTRATION; INTRACAUDAL; PERINEURAL at 09:11

## 2020-06-18 RX ADMIN — TRIAMCINOLONE ACETONIDE 40 MG: 40 INJECTION, SUSPENSION INTRA-ARTICULAR; INTRAMUSCULAR at 09:11

## 2020-06-18 NOTE — PROGRESS NOTES
"    [facility] [place] [facility] [place]        Subjective     CC: Pain of the Right Shoulder and Follow-up (6 week follow up; 3 months status post total knee replacement, right 03/11/20)      ANUEL Starkey is a 74 y.o. female.  Patient is here for new problem today regarding her right shoulder.  This is been bothering her for the last several months.  She has difficulty anterolaterally.  She has difficulty with overhead activity.  No treatment thus far.  She rates the pain is severe.  She has difficulty with sleeping as well.  Pain is her main issue.    Patient is also 3 months out from right total knee arthroplasty for valgus deformity.  She had been getting only home health PT after this point and we strongly encouraged her to get to outpatient physical therapy and she has recently started this with Jose La at [facility].        ROS:    Constiutional:Pt denies fever, chills, nausea, or vomiting.  MSK:as above        Objective      Past Medical History  Past Medical History:   Diagnosis Date   • Arthritis    • Glaucoma    • Hyperlipidemia          Physical Exam  Pulse 72   Ht 165.1 cm (65\")   Wt 68.4 kg (150 lb 12.8 oz)   SpO2 98%   BMI 25.09 kg/m²     Body mass index is 25.09 kg/m².    Patient is well nourished and well developed.        Ortho Exam  Musculoskeletal   Upper Extremity   Right Shoulder     Inspection and Palpation:     Medial border scapular tenderness-none    AC Joint Tenderness -moderate    Sensation is normal    Examination reveals no ecchymosis.        Strength and Tone:    Supraspinatus -4-5 with pain    External Rotators-5/5 with pain    Infraspinatus - 5/5    Subscapularis - 5/5    Deltoid - 5/5     Range of Motion      RightShoulder:    Internal Rotation: ROM - L4    External Rotation: AROM - 60 degrees    Elevation through flexion: AROM - 140 degrees        Impingement   Right shoulder    Evans-Jacoby impingement test positive    Neer impingement test " positive     Functional Testing   Right shoulder    AC crossover adduction test equivocal    Speeds test negative    Uppercut test negative    O'Briens test negative    Drop arm sign negative    Apprehension relocation negative    Right knee: Range of motion   Incision has healed and is free of erythema or drainage  1+ effusion      Imaging/Labs/EMG Reviewed:  Imaging Results (Last 24 Hours)     Procedure Component Value Units Date/Time    XR Knee 3+ View With [place] Right [[id number]] Resulted:  06/18/20 0856     Updated:  06/18/20 0856    Narrative:         Knee X-ray    Indication: status-post TKA    Study:  AP, Lateral, and Sunrise views of Right knee    Comparison: Right knee 3/11/2020    Findings:  No signs of acute fracture is visualized  No signs of loosening are appreciated  Components are well aligned    Impression:  Status post Right total knee arthroplasty. No signs of   loosening or fracture.        XR Shoulder 2+ View Right [[id number]] Resulted:  06/18/20 0855     Updated:  06/18/20 0856    Narrative:       Right Shoulder X-Ray    Indication: Pain    Study:  AP, axillary lateral, and scapular Y views    Comparison: None    Findings:  No acute fractures are visualized  No bony lesions are visualized.  Normal soft tissue appearance  AC joint: Hypertrophic moderate joint space narrowing  Glenohumeral joint: Mild joint space narrowing with osteophyte noted off   the calcar of the humeral head  Acromion type: 3      Impression:    No acute bony abnormalities noted  Type III acromion  Hypertrophic AC joint degenerative changes  Mild glenohumeral degenerative changes            Assessment    Assessment:  1. Right shoulder pain, unspecified chronicity    2. Status post total right knee replacement    3. Disorder of rotator cuff syndrome of right shoulder and allied disorder    4. Arthritis of right acromioclavicular joint        Plan:  1. Recommend over the counter anti-inflammatories for pain and/or  swelling  2. Chronic right shoulder pain with rotator cuff syndrome and AC joint arthritis--patient clinically has a rotator cuff tear as she is quite weak.  Subacromial junction will be given today when we will prescribe physical therapy for her shoulder as well.  3. Status post right total knee arthroplasty--patient is doing quite well and having no pain.  She still lacks range of motion both with terminal extension and flexion and I suspect Jose will work on this aggressively.  I will see her back in 3 months.      Remington Jeong MD  06/18/20  09:19

## 2020-06-18 NOTE — PROGRESS NOTES
Procedure   Large Joint Arthrocentesis: R subacromial bursa  Date/Time: 6/18/2020 9:11 AM  Consent given by: patient  Site marked: site marked  Timeout: Immediately prior to procedure a time out was called to verify the correct patient, procedure, equipment, support staff and site/side marked as required   Supporting Documentation  Indications: pain   Procedure Details  Location: shoulder - R subacromial bursa  Preparation: Patient was prepped and draped in the usual sterile fashion  Needle size: 25 G  Approach: posterior  Medications administered: 3 mL lidocaine PF 1% 1 %; 40 mg triamcinolone acetonide 40 MG/ML  Patient tolerance: patient tolerated the procedure well with no immediate complications

## 2020-09-22 ENCOUNTER — OFFICE VISIT (OUTPATIENT)
Dept: ORTHOPEDIC SURGERY | Facility: CLINIC | Age: 75
End: 2020-09-22

## 2020-09-22 VITALS — OXYGEN SATURATION: 99 % | WEIGHT: 150 LBS | HEIGHT: 65 IN | BODY MASS INDEX: 24.99 KG/M2 | HEART RATE: 69 BPM

## 2020-09-22 DIAGNOSIS — IMO0002 DISORDER OF ROTATOR CUFF SYNDROME OF RIGHT SHOULDER AND ALLIED DISORDER: ICD-10-CM

## 2020-09-22 DIAGNOSIS — M19.011 ARTHRITIS OF RIGHT ACROMIOCLAVICULAR JOINT: ICD-10-CM

## 2020-09-22 DIAGNOSIS — Z96.651 STATUS POST TOTAL RIGHT KNEE REPLACEMENT: Primary | ICD-10-CM

## 2020-09-22 DIAGNOSIS — M25.511 RIGHT SHOULDER PAIN, UNSPECIFIED CHRONICITY: ICD-10-CM

## 2020-09-22 PROCEDURE — 99213 OFFICE O/P EST LOW 20 MIN: CPT | Performed by: ORTHOPAEDIC SURGERY

## 2020-09-22 NOTE — PROGRESS NOTES
"    [facility] [place] [facility] [place]        Subjective     CC: Follow-up (3 months- Right shoulder pain, 6 months s/p (R) TKA 03/11/2020)      ANUEL Starkey is a 75 y.o. female.  Patient is here today 6 months out from right total knee arthroplasty on 3/11/2020.  She has stopped going to physical therapy.  At her last visit, we injected her right shoulder and that is been doing well also.  She is getting little bit of swelling in her leg when she walks.    Overall, patient's symptoms are much better overall with both the shoulder and knee.    ROS:    Constiutional:Pt denies fever, chills, nausea, or vomiting.  MSK:as above        Objective      Past Medical History  Past Medical History:   Diagnosis Date   • Arthritis    • Glaucoma    • Hyperlipidemia          Physical Exam  Pulse 69   Ht 165.1 cm (65\")   Wt 68 kg (150 lb)   SpO2 99%   BMI 24.96 kg/m²     Body mass index is 24.96 kg/m².    Patient is well nourished and well developed.        Ortho Exam  Range of motion 5-120  Incision is healed and free of erythema or drainage  There is a Band-Aid on the mid tibia region from skin cancer excision  Calf soft and nontender  Right shoulder shows full forward elevation and external rotation.    Imaging/Labs/EMG Reviewed:  Imaging Results (Last 24 Hours)     ** No results found for the last 24 hours. **          Assessment    Assessment:  1. Status post total right knee replacement    2. Right shoulder pain, unspecified chronicity    3. Disorder of rotator cuff syndrome of right shoulder and allied disorder    4. Arthritis of right acromioclavicular joint        Plan:  1. Recommend over the counter anti-inflammatories for pain and/or swelling  2. Status post right total knee arthroplasty--patient doing well overall.  Continue indefinite antibiotic prophylaxis.  Follow-up in 6 months with an x-ray or sooner if necessary.  Continue to work on stretching of the hamstrings for some mild medial knee " tenderness.  3. Right rotator cuff syndrome with AC joint arthritis--observe for now.  Patient is doing better after cervical injection.      Remington Jeong MD  09/22/20  09:09 WARDT      Dragon disclaimer:  Much of this encounter note is an electronic transcription/translation of spoken language to printed text. The electronic translation of spoken language may permit erroneous, or at times, nonsensical words or phrases to be inadvertently transcribed; Although I have reviewed the note for such errors, some may still exist.

## 2021-03-05 ENCOUNTER — OFFICE VISIT (OUTPATIENT)
Dept: ORTHOPEDIC SURGERY | Facility: CLINIC | Age: 76
End: 2021-03-05

## 2021-03-05 VITALS — BODY MASS INDEX: 27.16 KG/M2 | HEART RATE: 69 BPM | HEIGHT: 65 IN | OXYGEN SATURATION: 98 % | WEIGHT: 163 LBS

## 2021-03-05 DIAGNOSIS — Z96.651 STATUS POST TOTAL RIGHT KNEE REPLACEMENT: ICD-10-CM

## 2021-03-05 DIAGNOSIS — S83.411A SPRAIN OF MEDIAL COLLATERAL LIGAMENT OF RIGHT KNEE, INITIAL ENCOUNTER: Primary | ICD-10-CM

## 2021-03-05 PROCEDURE — 99213 OFFICE O/P EST LOW 20 MIN: CPT | Performed by: PHYSICIAN ASSISTANT

## 2021-03-05 RX ORDER — CELECOXIB 200 MG/1
CAPSULE ORAL
COMMUNITY
Start: 2021-02-17 | End: 2021-03-23

## 2021-03-05 RX ORDER — VALSARTAN AND HYDROCHLOROTHIAZIDE 80; 12.5 MG/1; MG/1
TABLET, FILM COATED ORAL
COMMUNITY
Start: 2021-01-05

## 2021-03-05 NOTE — PROGRESS NOTES
"    [facility] [place] [facility] Note        Subjective     CC: Follow-up (5 month f/u; 1 year s/p (R) TKA 03/11/2020)      HPI    Ruthie Starkey is a 75 y.o. female.  Patient presents today for evaluation of her right knee.  She had undergone TKA by Dr. Jeong on 3/11/2020.  Patient reports she did well following the TKA.  Initially she did have issues with stiffness and medial sided knee pain.      She states she is a very active individual walking 2 to 3 miles a day.  Recently in addition to the walking she began a pool workout that included with type kick after which she noted stiffness to the knee as well as medial sided joint pain.  She rested for a few days and then yesterday attempted to walk again only to have an exacerbation of pain.  No mechanical symptoms such as locking or catching.  She reports that she has been prescribed Celebrex but Aleve works better for so she has been taking it.    Patient puts unknown.  Her pain tends to improve with decreased activity and worsens with increased weightbearing.  Associated symptoms stiffness.  No significant swelling to the knee.  Once again symptoms are typically worse with walking.  No reported numbness or tingling into the extremity.      ROS:    Constiutional:Pt denies fever, chills, nausea, or vomiting.  MSK:as above        Objective      Past Medical History  Past Medical History:   Diagnosis Date   • Arthritis    • Glaucoma    • Hyperlipidemia          Physical Exam  Pulse 69   Ht 165.1 cm (65\")   Wt 73.9 kg (163 lb)   SpO2 98%   BMI 27.12 kg/m²     Body mass index is 27.12 kg/m².    Patient is well nourished and well developed.        Ortho Exam  Integument:   Left knee: Incision is well-healed without redness, warmth or evidence of infection.      Lower Extremities:   Left Knee:    Tenderness:  Positive tenderness along MCL, medial knee.  No other tenderness throughout the rest of the knee    Effusion: "  None    Swelling: None    Crepitus:  None    Range of motion:  Extension: 5°       Flexion: 120°  Instability:  Lachman's negative.  Varus stress negative.  With valgus stress there is no laxity or instability however patient has increased pain.  Deformities:  None      Imaging/Labs/EMG Reviewed:  Ordered right knee plain films.  Imaging read by Dr. Montano.    Indication: Right knee pain     Comparison: Todays xrays were compared to previous xrays from 6/18/2020     IMPRESSION:      Right Knee: Demonstrate well positioned knee arthroplasty components in satisfactory alignment without evidence of wear, loosening, subsidence, fracture, or osteolysis and No significant changes compared to prior radiographs.      Assessment:  1. Sprain of medial collateral ligament of right knee, initial encounter    2. Status post total right knee replacement        Plan:  1. Right knee medial collateral ligament sprain--patient was provided a hinged knee brace.  She may continue use of her Aleve for inflammation/swelling control.  I offered to send her to formal PT but she politely declined.  She was given home knee exercises instead to work on range of motion, stretching and strengthening but I reviewed with her which exercises to avoid so that she does not place further stress on the MCL.  2. Status post right TKA--stable.  3. Follow-up with Dr. Jeong on 3/23/2021 for repeat evaluation, sooner if issues arise or symptoms worsen/change.  4. Questions and concerns answered.      Amalia Saba PA-C  03/09/21  10:32 EST      Dragon disclaimer:  Much of this encounter note is an electronic transcription/translation of spoken language to printed text. The electronic translation of spoken language may permit erroneous, or at times, nonsensical words or phrases to be inadvertently transcribed; Although I have reviewed the note for such errors, some may still exist.

## 2021-03-23 ENCOUNTER — OFFICE VISIT (OUTPATIENT)
Dept: ORTHOPEDIC SURGERY | Facility: CLINIC | Age: 76
End: 2021-03-23

## 2021-03-23 VITALS
DIASTOLIC BLOOD PRESSURE: 73 MMHG | HEIGHT: 65 IN | BODY MASS INDEX: 27.16 KG/M2 | HEART RATE: 68 BPM | SYSTOLIC BLOOD PRESSURE: 131 MMHG | WEIGHT: 163 LBS

## 2021-03-23 DIAGNOSIS — Z96.651 STATUS POST TOTAL RIGHT KNEE REPLACEMENT: Primary | ICD-10-CM

## 2021-03-23 DIAGNOSIS — M25.511 RIGHT SHOULDER PAIN, UNSPECIFIED CHRONICITY: ICD-10-CM

## 2021-03-23 DIAGNOSIS — M70.51 PES ANSERINUS BURSITIS OF RIGHT KNEE: ICD-10-CM

## 2021-03-23 PROCEDURE — 99213 OFFICE O/P EST LOW 20 MIN: CPT | Performed by: ORTHOPAEDIC SURGERY

## 2021-03-23 ASSESSMENT — KOOS JR
KOOS JR SCORE: 5
KOOS JR SCORE: 73.342

## 2021-03-23 NOTE — PROGRESS NOTES
"    [facility] [place] [facility] Note        Subjective     CC: Follow-up (6 months RECHECK -- Right shoulder pain, 1 YEAR  s/p (R) TKA 03/11/2020))      ANUEL Starkey is a 75 y.o. female.  Patient returns the office today now 1 year out from right total knee arthroplasty on 3/11/2020.  She is doing well overall.  She continues have some medial sided knee pain.  She saw Amalia several months ago and her knee pain is better than when she saw Amalia but persist on the medial side of the knee.  She has difficulty when she \"overdoes it\".  The right shoulder is better overall.    Overall, patient's symptoms are better in the shoulder and slightly better in the knee.    ROS:    Constiutional:Pt denies fever, chills, nausea, or vomiting.  MSK:as above        Objective      Past Medical History  Past Medical History:   Diagnosis Date   • Arthritis    • Glaucoma    • Hyperlipidemia          Physical Exam  /73   Pulse 68   Ht 165.1 cm (65\")   Wt 73.9 kg (163 lb)   BMI 27.12 kg/m²     Body mass index is 27.12 kg/m².    Patient is well nourished and well developed.        Ortho Exam  Musculoskeletal   Upper Extremity   Right Shoulder       Strength and Tone:    Supraspinatus -5 out of 5    External Rotators-5/5    Infraspinatus - 5/5    Subscapularis - 5/5    Deltoid - 5/5     Range of Motion      Right Shoulder:    Internal Rotation: ROM - L4    External Rotation: AROM - 70 degrees    Elevation through flexion: AROM - 140 degrees     AC joint:  non tender to palpation    AC joint:  negative crossover  Right knee:  Calf:  Soft and non tender  Effusion:  None  Pulses:  2+  Warmth:  None  Incision:  Healed   Tender to palpation over the pes bursa    ROM:  Right:  Extension:5    Flexion:120      Deformities/Malalignments/Discrepancies:    Right:  none    Functional Testing:  Right:  Straight Leg Raise: 5/5  Patella Tracking:  Normal      Imaging/Labs/EMG Reviewed:  Imaging Results (Last 24 " Hours)     Procedure Component Value Units Date/Time    XR Knee 3+ View With [place] Right [[id number]] Resulted: 03/23/21 0910     Updated: 03/23/21 0910    Narrative:      Knee X-ray    Indication: status-post TKA    Study:  AP, Lateral, and Sunrise views of Right knee    Comparison: Right knee 3/5/2021    Findings:  No signs of acute fracture are visualized  No signs of loosening are appreciated  Components are well aligned    Impression:  Status post Right total knee arthroplasty. No signs of   loosening or fracture.                Assessment    Assessment:  1. Status post total right knee replacement    2. Pes anserinus bursitis of right knee    3. Right shoulder pain, unspecified chronicity        Plan:  1. Recommend over the counter anti-inflammatories for pain and/or swelling  2. Status post right total knee arthroplasty with medial sided knee pain--patient has pes bursitis.  Recommend some hamstring stretches.  We will send her to physical therapy for some iontophoresis.  I will see her back in a year with x-rays or sooner if necessary if her knee pain worsens or persist.  3. Right shoulder pain--observe for now.      Remington Jeong MD  03/23/21  09:24 EDT      Dragon disclaimer:  Much of this encounter note is an electronic transcription/translation of spoken language to printed text. The electronic translation of spoken language may permit erroneous, or at times, nonsensical words or phrases to be inadvertently transcribed; Although I have reviewed the note for such errors, some may still exist.

## 2022-03-24 ENCOUNTER — OFFICE VISIT (OUTPATIENT)
Dept: ORTHOPEDIC SURGERY | Facility: CLINIC | Age: 77
End: 2022-03-24

## 2022-03-24 VITALS
DIASTOLIC BLOOD PRESSURE: 80 MMHG | HEIGHT: 65 IN | WEIGHT: 160 LBS | BODY MASS INDEX: 26.66 KG/M2 | SYSTOLIC BLOOD PRESSURE: 132 MMHG

## 2022-03-24 DIAGNOSIS — Z96.651 STATUS POST TOTAL RIGHT KNEE REPLACEMENT: Primary | ICD-10-CM

## 2022-03-24 PROCEDURE — 99213 OFFICE O/P EST LOW 20 MIN: CPT | Performed by: ORTHOPAEDIC SURGERY

## 2022-03-24 NOTE — PROGRESS NOTES
"    [facility] [place] [facility] [place]        Subjective     CC: Follow-up (1 year follow up - 2 years  s/p (R) TKA 03/11/2020)      HPI    Ruthie Starkey is a 76 y.o. female.  Patient turns the office today now 2 years out from right total knee arthroplasty on 3/11/2020.  She is doing great.  No complaints.    Overall, patient's symptoms are as above.    ROS:    Constiutional:Pt denies fever, chills, nausea, or vomiting.  MSK:as above        Objective      Past Medical History  Past Medical History:   Diagnosis Date   • Arthritis    • Glaucoma    • Hyperlipidemia          Physical Exam  /80   Ht 165.1 cm (65\")   Wt 72.6 kg (160 lb)   BMI 26.63 kg/m²     Body mass index is 26.63 kg/m².    Patient is well nourished and well developed.        Ortho Exam  Peripheral Vascular:    Upper Extremity:   Inspection:  Left--no cyanotic nail beds Right--no cyanotic nail beds   Bilateral:  Pink nail beds with brisk capillary refill   Palpation:  Bilateral radial pulse normal    Musculoskeletal:      Lower Extremity:     Inspection and Palpation:      Right knee:  Calf:  Soft and non tender  Effusion:  None  Pulses:  2+  Warmth:  None  Incision:  Healed     ROM:  Right:  Extension:0    Flexion:120      Deformities/Malalignments/Discrepancies:    Right:  none    Functional Testing:  Right:  Straight Leg Raise: 5/5  Patella Tracking:  Normal    Imaging/Labs/EMG Reviewed:  Imaging Results (Last 24 Hours)     Procedure Component Value Units Date/Time    XR Knee 3+ View With [place] Right [[id number]] Resulted: 03/24/22 0846     Updated: 03/24/22 0846    Narrative:      Knee X-ray    Indication: status-post TKA    Study:  AP, Lateral, and Sunrise views of Right knee    Comparison: Right knee 3/23/2021    Findings:  No signs of acute fracture are visualized  No signs of loosening are appreciated  Components are well aligned    Impression:  Status post Right total knee arthroplasty. No signs of   loosening or " fracture.                Assessment    Assessment:  1. Status post total right knee replacement        Plan:  1. Recommend over the counter anti-inflammatories for pain and/or swelling  2. Status post right total knee arthroplasty--patient is doing well overall.  Continue indefinite antibiotic prophylaxis.  See her back in a year with x-rays or sooner if necessary.      Remington Jeong MD  03/24/22  09:25 EDT      Dictated Utilizing Dragon Dictation.

## 2022-06-02 ENCOUNTER — OFFICE VISIT (OUTPATIENT)
Dept: ORTHOPEDIC SURGERY | Facility: CLINIC | Age: 77
End: 2022-06-02

## 2022-06-02 VITALS
HEIGHT: 65 IN | BODY MASS INDEX: 26.89 KG/M2 | SYSTOLIC BLOOD PRESSURE: 122 MMHG | WEIGHT: 161.4 LBS | DIASTOLIC BLOOD PRESSURE: 70 MMHG

## 2022-06-02 DIAGNOSIS — M25.562 LEFT KNEE PAIN, UNSPECIFIED CHRONICITY: Primary | ICD-10-CM

## 2022-06-02 DIAGNOSIS — M17.12 PRIMARY OSTEOARTHRITIS OF LEFT KNEE: ICD-10-CM

## 2022-06-02 PROCEDURE — 99214 OFFICE O/P EST MOD 30 MIN: CPT | Performed by: ORTHOPAEDIC SURGERY

## 2022-06-02 NOTE — PROGRESS NOTES
[facility] Orthopaedic Surgery Clinic Note        Subjective     Pain of the Left Knee      HPI    Ruthie Starkey is a 76 y.o. female who presents with new problem of: left knee pain.  Onset: atraumatic and gradual in nature. The issue has been ongoing for 4 week(s). Pain is a 3/10 on the pain scale. Pain is described as dull and stabbing. Associated symptoms include stiffness. The pain is worse with walking and working; resting improve the pain. Previous treatments have included: NSAIDS.    I have reviewed the following portions of the patient's history:History of Present Illness and review of systems.       Patient is here today for new problem day regarding her left knee.  This has bothered her on and off for quite some time.  She had a flareup of sciatica couple of weeks ago when she was doing a lot of work and this had affected her gait which caused her left knee to flareup.  She says her left knee is not as severe as her right knee was prior to surgery for knee replacement.      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   • SKIN CANCER EXCISION      right thigh,    • TONSILLECTOMY     • TOTAL KNEE ARTHROPLASTY Right 3/11/2020    Procedure: TOTAL KNEE ARTHROPLASTY RIGHT;  Surgeon: Remington Jeong MD;  Location: [facility];  Service: Orthopedics;  Laterality: Right;   • TUBAL ABDOMINAL LIGATION     • WISDOM TOOTH EXTRACTION        Family History   Problem Relation Age of Onset   • Hypertension Mother    • Heart disease Mother    • Osteoarthritis Father    • Heart disease Father      Social History     Socioeconomic History   • Marital status: Single   Tobacco Use   • Smoking status: Former Smoker     Years: 20.00     Types: Cigarettes     Quit date:      Years since quittin.4   • Smokeless tobacco: Never Used   •  "Tobacco comment: socially    Vaping Use   • Vaping Use: Never used   Substance and Sexual Activity   • Alcohol use: Yes     Comment: social   • Drug use: Never   • Sexual activity: Defer      Current Outpatient Medications on File Prior to Visit   Medication Sig Dispense Refill   • dorzolamide (TRUSOPT) 2 % ophthalmic solution      • fluticasone (FLONASE) 50 MCG/ACT nasal spray 2 sprays into the nostril(s) as directed by provider Daily. 16 g 0   • pravastatin (PRAVACHOL) 20 MG tablet      • valsartan-hydrochlorothiazide (DIOVAN-HCT) 80-12.5 MG per tablet      • [DISCONTINUED] amoxicillin (AMOXIL) 500 MG capsule Take 2 capsules by mouth 2 (Two) Times a Day. 40 capsule 0     No current facility-administered medications on file prior to visit.      No Known Allergies       Review of Systems   Constitutional: Negative.    HENT: Negative.    Eyes: Negative.    Respiratory: Negative.    Cardiovascular: Negative.    Gastrointestinal: Negative.    Endocrine: Negative.    Genitourinary: Negative.    Musculoskeletal: Positive for arthralgias.   Skin: Negative.    Allergic/Immunologic: Negative.    Neurological: Negative.    Hematological: Negative.    Psychiatric/Behavioral: Negative.         I reviewed the patient's chief complaint, history of present illness, review of systems, past medical history, surgical history, family history, social history, medications and allergy list.        Objective      Physical Exam  /70   Ht 165.1 cm (65\")   Wt 73.2 kg (161 lb 6.4 oz)   BMI 26.86 kg/m²     Body mass index is 26.86 kg/m².    General  Mental Status - alert  General Appearance - cooperative, well groomed, not in acute distress  Orientation - Oriented X3  Build & Nutrition - well developed and well nourished  Posture - normal posture  Gait - normal gait       Ortho Exam  Peripheral Vascular:    Upper Extremity:   Inspection:  Left--no cyanotic nail beds Right--no cyanotic nail beds   Bilateral:  Pink nail beds with brisk " capillary refill   Palpation:  Bilateral radial pulse normal    Musculoskeletal:  Global Assessment:  Overall assessment of Lower Extremity Muscle Strength and Tone:  Left quadriceps--5/5   Left hamstrings--5/5       Left tibialis anterior--5/5  Left gastroc-soleus--5/5  Left EHL --5/5    Lower Extremity:  Knee/Patella:  No digital clubbing or cyanosis.    Examination of left knee reveals:  Normal deep tendon reflexes, coordination, strength, tone, sensation.  No known fractures or deformities.    Inspection and Palpation:  Left knee:  Tenderness:  Over the lateral joint line and moderate severity  Effusion:  1+  Crepitus:  Positive  Pulses:  2+  Ecchymosis:  None  Warmth:  None     ROM:  Left:  Extension:5    Flexion:120  Right:  Extension:5     Flexion:120    Instability:    Left:  Lachman Test:  Negative, Varus stress test negative, Valgus stress test negative    Deformities/Malalignments/Discrepancies:    Left:  Genu valgum    Functional Testing:  Jaun's test:  Negative  Patella grind test:  Positive  Q-angle:  normal    Imaging/Studies  Imaging Results (Last 24 Hours)     Procedure Component Value Units Date/Time    XR Knee 4+ View Left [[id number]] Resulted: 06/02/22 0844     Updated: 06/02/22 0844    Narrative:      Knee X-Ray    Indication: Pain    Study:  Upright AP, Skiers, Lateral, and Sunrise views of Left knee(s)    Comparison: None    Findings:    Patient appears to have mild degenerative changes in the medial   compartment.    There are end-stage degenerative changes in the lateral compartment.    There are severe changes in the patellofemoral compartment.    Patient has overall valgus alignment.    Kellgren Leo rdgrdrrdarddrderd:rd rd3rd Impression:   End-stage lateral compartment and patellofemoral compartment degenerative   changes of the knee              Assessment    Assessment:  1. Left knee pain, unspecified chronicity    2. Primary osteoarthritis of left knee        Plan:  1. Continue  over-the-counter medication as needed for discomfort  2. End-stage osteoarthritis left knee valgus pattern--patient is scheduled to go with her granddaughter to [place] for medical procedure on July 16.  We will try to get her in as good shape as we can prior to the trip and therefore we will fit her for a lateral compartment offloading brace today and plan on a corticosteroid injection into the left knee on 6/30/2022.        Remington Jeong MD  06/02/22  09:01 EDT      Dictated Utilizing Dragon Dictation.

## 2022-06-30 ENCOUNTER — OFFICE VISIT (OUTPATIENT)
Dept: ORTHOPEDIC SURGERY | Facility: CLINIC | Age: 77
End: 2022-06-30

## 2022-06-30 DIAGNOSIS — M17.12 PRIMARY OSTEOARTHRITIS OF LEFT KNEE: Primary | ICD-10-CM

## 2022-06-30 PROCEDURE — 20610 DRAIN/INJ JOINT/BURSA W/O US: CPT | Performed by: ORTHOPAEDIC SURGERY

## 2022-06-30 RX ORDER — TRIAMCINOLONE ACETONIDE 40 MG/ML
40 INJECTION, SUSPENSION INTRA-ARTICULAR; INTRAMUSCULAR
Status: COMPLETED | OUTPATIENT
Start: 2022-06-30 | End: 2022-06-30

## 2022-06-30 RX ORDER — LIDOCAINE HYDROCHLORIDE 10 MG/ML
3 INJECTION, SOLUTION EPIDURAL; INFILTRATION; INTRACAUDAL; PERINEURAL
Status: COMPLETED | OUTPATIENT
Start: 2022-06-30 | End: 2022-06-30

## 2022-06-30 RX ADMIN — LIDOCAINE HYDROCHLORIDE 3 ML: 10 INJECTION, SOLUTION EPIDURAL; INFILTRATION; INTRACAUDAL; PERINEURAL at 08:27

## 2022-06-30 RX ADMIN — TRIAMCINOLONE ACETONIDE 40 MG: 40 INJECTION, SUSPENSION INTRA-ARTICULAR; INTRAMUSCULAR at 08:27

## 2022-06-30 NOTE — PROGRESS NOTES
[facility] [place] Orthopaedic Surgery Clinic Note        Subjective     CC: Follow-up (4 week recheck  - Primary osteoarthritis of left knee )      HPI    Ruthie Starkey is a 76 y.o. female.  Patient returns the office today for follow-up of her left knee arthritis.  Scheduled to go to [place] with her granddaughter for surgical procedure and we have planned on injecting her with steroid today.    Overall, patient's symptoms are as above.    ROS:    Constiutional:Pt denies fever, chills, nausea, or vomiting.  MSK:as above        Objective      Past Medical History  Past Medical History:   Diagnosis Date   • Arthritis    • Glaucoma    • Hyperlipidemia        Assessment    Assessment:  1. Primary osteoarthritis of left knee        Plan:  1. Recommend over the counter anti-inflammatories for pain and/or swelling  2. Left knee arthritis--steroid injection will be given today.  Follow-up as needed going forward for the left knee.  3. Status post right total knee arthroplasty--patient is scheduled to come back in March 2023 or sooner if necessary.    Procedure Note:    I discussed with the patient the potential benefits of performing a therapeutic injections as well as potential risks including but not limited to infection, swelling, pain, bleeding, bruising, nerve/vessel damage, skin color changes, transient elevation in blood glucose levels, and fat atrophy. After informed consent and after the areas were prepped with chlorhexadine soap, ethyl chloride was used to numb the skin. Via the anterolateral approach, 3mL of 1% lidocaine followed by 40mg of Kenalog were each injected into the left knee joint. The patient tolerated the procedure well. There were no complications. A sterile dressing was placed over the injection sites.        Remington Jeong MD  06/30/22  08:44 EDT      Dictated Utilizing Dragon Dictation.

## 2022-06-30 NOTE — PROGRESS NOTES
Procedure   Large Joint Arthrocentesis: L knee  Date/Time: 6/30/2022 8:27 AM  Consent given by: patient  Site marked: site marked  Timeout: Immediately prior to procedure a time out was called to verify the correct patient, procedure, equipment, support staff and site/side marked as required   Supporting Documentation  Indications: pain   Procedure Details  Location: knee - L knee  Preparation: Patient was prepped and draped in the usual sterile fashion  Needle size: 25 G  Approach: anterolateral  Medications administered: 40 mg triamcinolone acetonide 40 MG/ML; 3 mL lidocaine PF 1% 1 %  Patient tolerance: patient tolerated the procedure well with no immediate complications

## 2023-03-23 ENCOUNTER — OFFICE VISIT (OUTPATIENT)
Dept: ORTHOPEDIC SURGERY | Facility: CLINIC | Age: 78
End: 2023-03-23
Payer: MEDICARE

## 2023-03-23 VITALS
BODY MASS INDEX: 26.82 KG/M2 | HEIGHT: 65 IN | DIASTOLIC BLOOD PRESSURE: 74 MMHG | WEIGHT: 161 LBS | SYSTOLIC BLOOD PRESSURE: 138 MMHG

## 2023-03-23 DIAGNOSIS — Z96.651 STATUS POST TOTAL RIGHT KNEE REPLACEMENT: Primary | ICD-10-CM

## 2023-03-23 DIAGNOSIS — M17.12 PRIMARY OSTEOARTHRITIS OF LEFT KNEE: ICD-10-CM

## 2023-03-23 PROCEDURE — 99213 OFFICE O/P EST LOW 20 MIN: CPT | Performed by: ORTHOPAEDIC SURGERY

## 2023-03-23 NOTE — PROGRESS NOTES
"    [facility] [place] Orthopaedic Surgery Clinic Note        Subjective     CC: Follow-up (1 year follow up - 3 years  s/p (R) TKA 2020)      HPI    Ruthie Starkey is a 77 y.o. female.  Patient is here today for follow-up of her right total knee arthroplasty performed on 3/11/2020.  She is doing very well overall.  She says she is having no problems.  Has been seen most recently for left knee arthritis.  She says currently is doing well.    Overall, patient's symptoms are as above.    ROS:    Constiutional:Pt denies fever, chills, nausea, or vomiting.  MSK:as above        Objective      Past Medical History  Past Medical History:   Diagnosis Date   • Arthritis    • Glaucoma    • Hyperlipidemia      Social History     Socioeconomic History   • Marital status: Single   Tobacco Use   • Smoking status: Former     Years: 20.00     Types: Cigarettes     Quit date:      Years since quittin.2   • Smokeless tobacco: Never   • Tobacco comments:     socially    Vaping Use   • Vaping Use: Never used   Substance and Sexual Activity   • Alcohol use: Yes     Comment: social   • Drug use: Never   • Sexual activity: Defer          Physical Exam  /74   Ht 165.1 cm (65\")   Wt 73 kg (161 lb)   BMI 26.79 kg/m²     Body mass index is 26.79 kg/m².    Patient is well nourished and well developed.        Ortho Exam        Lower Extremity:     Inspection and Palpation:      Right knee:  Calf:  Soft and non tender  Effusion:  None  Pulses:  2+  Warmth:  None  Incision:  Healed     ROM:  Right:  Extension:0    Flexion:120      Deformities/Malalignments/Discrepancies:    Right:  none    Functional Testing:  Right:  Straight Leg Raise: 5/5  Patella Tracking:  Normal      Imaging/Labs/EMG Reviewed:  Imaging Results (Last 24 Hours)     Procedure Component Value Units Date/Time    XR Knee 3+ View With Bootjack Right [[id number]] Resulted: 23     Updated: 23    Narrative:      Knee X-ray    Indication: " status-post TKA    Study:  AP, Lateral, and Sunrise views of Right knee    Comparison: Right knee 3/24/2022    Findings:  No signs of acute fracture are visualized  No signs of loosening are appreciated  Components are well aligned    Impression:  Status post Right total knee arthroplasty. No signs of   loosening or fracture.                Assessment    Assessment:  1. Status post total right knee replacement    2. Primary osteoarthritis of left knee        Plan:  1. Recommend over the counter anti-inflammatories for pain and/or swelling  2. Left knee arthritis--valgus pattern.  Currently doing well.  Observe for now.  3. Status post right total knee arthroplasty 3/11/2020--patient is doing very well overall.  Continue indefinite antibiotic prophylaxis.  See her back in 12 months with an x-ray or sooner if necessary.      Remington Jeong MD  03/23/23  09:16 EDT      Dictated Utilizing Dragon Dictation.

## 2023-03-30 ENCOUNTER — OFFICE VISIT (OUTPATIENT)
Dept: ORTHOPEDIC SURGERY | Facility: CLINIC | Age: 78
End: 2023-03-30
Payer: MEDICARE

## 2023-03-30 VITALS
WEIGHT: 161 LBS | DIASTOLIC BLOOD PRESSURE: 68 MMHG | HEIGHT: 65 IN | SYSTOLIC BLOOD PRESSURE: 120 MMHG | BODY MASS INDEX: 26.82 KG/M2

## 2023-03-30 DIAGNOSIS — M25.512 LEFT SHOULDER PAIN, UNSPECIFIED CHRONICITY: Primary | ICD-10-CM

## 2023-03-30 DIAGNOSIS — M19.012 PRIMARY OSTEOARTHRITIS OF LEFT SHOULDER: ICD-10-CM

## 2023-03-30 RX ORDER — LIDOCAINE HYDROCHLORIDE 10 MG/ML
3 INJECTION, SOLUTION EPIDURAL; INFILTRATION; INTRACAUDAL; PERINEURAL
Status: COMPLETED | OUTPATIENT
Start: 2023-03-30 | End: 2023-03-30

## 2023-03-30 RX ORDER — TRIAMCINOLONE ACETONIDE 40 MG/ML
40 INJECTION, SUSPENSION INTRA-ARTICULAR; INTRAMUSCULAR
Status: COMPLETED | OUTPATIENT
Start: 2023-03-30 | End: 2023-03-30

## 2023-03-30 RX ADMIN — LIDOCAINE HYDROCHLORIDE 3 ML: 10 INJECTION, SOLUTION EPIDURAL; INFILTRATION; INTRACAUDAL; PERINEURAL at 08:43

## 2023-03-30 RX ADMIN — TRIAMCINOLONE ACETONIDE 40 MG: 40 INJECTION, SUSPENSION INTRA-ARTICULAR; INTRAMUSCULAR at 08:43

## 2023-03-30 NOTE — PROGRESS NOTES
"    [facility] [place] [facility] [place] [place] [facility] [place]        Subjective     CC: Pain of the Left Shoulder      HPI    Ruthie Starkey is a 77 y.o. female.  Patient is here today for new problem regarding her left shoulder.  This has bothered her on and off for quite some time.  About 3 years ago, she saw me for a right shoulder problem and did well after subacromial injection.  She feels popping and cracking in the shoulder.    Overall, patient's symptoms are as above    ROS:    Constiutional:Pt denies fever, chills, nausea, or vomiting.  MSK:as above        Objective      Past Medical History  Past Medical History:   Diagnosis Date   • Arthritis    • Glaucoma    • Hyperlipidemia      Social History     Socioeconomic History   • Marital status: Single   Tobacco Use   • Smoking status: Former     Years: 20.00     Types: Cigarettes     Quit date:      Years since quittin.2   • Smokeless tobacco: Never   • Tobacco comments:     socially    Vaping Use   • Vaping Use: Never used   Substance and Sexual Activity   • Alcohol use: Yes     Comment: social   • Drug use: Never   • Sexual activity: Defer          Physical Exam  /68   Ht 165.1 cm (65\")   Wt 73 kg (161 lb)   BMI 26.79 kg/m²     Body mass index is 26.79 kg/m².    Patient is well nourished and well developed.        Ortho Exam  Musculoskeletal   Upper Extremity   Left Shoulder     Inspection and Palpation:     Medial border scapular tenderness-none    AC Joint Tenderness -none    Sensation is normal    Examination reveals no ecchymosis.        Strength and Tone:    Supraspinatus - 5/5 with mild pain    External Rotators-5/5    Infraspinatus - 5/5    Subscapularis - 5/5 with mild pain    Deltoid - 5/5     Range of Motion      Left Shoulder:    Internal Rotation: ROM -hip pocket    External Rotation: AROM -50 degrees    Elevation through flexion: AROM -120 degrees     Abduction: 90 degrees       Impingement   Left shoulder    Evans-Jacoby impingement " test positive    Neer impingement test positive     Functional Testing   Left shoulder    AC crossover adduction test positive      Imaging/Labs/EMG Reviewed:  Imaging Results (Last 24 Hours)     Procedure Component Value Units Date/Time    XR Shoulder 2+ View Left [[id number]] Resulted: 03/30/23 0834     Updated: 03/30/23 0834    Narrative:      Left Shoulder X-Ray    Indication: Pain    Study:  AP, axillary lateral, and scapular Y views    Comparison: None    Findings:  No acute fractures are visualized  No bony lesions are visualized.  Normal soft tissue appearance  AC joint: Mild joint space narrowing  Glenohumeral joint: Severe hypertrophic joint space narrowing  Acromion type: 1      Impression:    No acute bony abnormalities noted  Type I acromion  Severe glenohumeral degenerative changes              Assessment    Assessment:  1. Left shoulder pain, unspecified chronicity    2. Adhesive capsulitis of left shoulder    3. Primary osteoarthritis of left shoulder        Plan:  1. Recommend over the counter anti-inflammatories for pain and/or swelling  2. Left shoulder pain with severe osteoarthritis --patient symptoms are fairly typical for the diagnosis.  Plan to be for glenohumeral injection today and we will see how effective this is.  She tells me she does not have time for anything aggressive with regards to treatment.    Procedure Note:    I discussed with the patient the potential benefits of performing a therapeutic injections as well as potential risks including but not limited to infection, swelling, pain, bleeding, bruising, nerve/vessel damage, skin color changes, transient elevation in blood glucose levels, and fat atrophy. After informed consent and after the areas were prepped with chlorhexadine soap, ethyl chloride was used to numb the skin. Via the anterior approach, 3mL of 1% lidocaine followed by 40mg of Kenalog were each injected into the glenohumeral joint of the left shoulder. The patient  tolerated the procedure well. There were no complications. A sterile dressing was placed over the injection sites.        Remington Jeong MD  03/30/23  08:41 EDT      Dictated Utilizing Dragon Dictation.

## 2023-03-30 NOTE — PROGRESS NOTES
Procedure   - Large Joint Arthrocentesis: L glenohumeral on 3/30/2023 8:43 AM  Indications: pain  Details: 22 G needle, anterior approach  Medications: 3 mL lidocaine PF 1% 1 %; 40 mg triamcinolone acetonide 40 MG/ML  Outcome: tolerated well, no immediate complications  Procedure, treatment alternatives, risks and benefits explained, specific risks discussed. Consent was given by the patient. Immediately prior to procedure a time out was called to verify the correct patient, procedure, equipment, support staff and site/side marked as required. Patient was prepped and draped in the usual sterile fashion.

## 2024-03-21 ENCOUNTER — OFFICE VISIT (OUTPATIENT)
Dept: ORTHOPEDIC SURGERY | Facility: CLINIC | Age: 79
End: 2024-03-21
Payer: MEDICARE

## 2024-03-21 VITALS
BODY MASS INDEX: 27.99 KG/M2 | HEIGHT: 65 IN | WEIGHT: 168 LBS | DIASTOLIC BLOOD PRESSURE: 84 MMHG | SYSTOLIC BLOOD PRESSURE: 120 MMHG

## 2024-03-21 DIAGNOSIS — Z96.651 STATUS POST TOTAL RIGHT KNEE REPLACEMENT: Primary | ICD-10-CM

## 2024-03-21 DIAGNOSIS — M25.562 LEFT KNEE PAIN, UNSPECIFIED CHRONICITY: ICD-10-CM

## 2024-03-21 DIAGNOSIS — M19.012 PRIMARY OSTEOARTHRITIS OF LEFT SHOULDER: ICD-10-CM

## 2024-03-21 RX ORDER — PREDNISONE 5 MG/1
5 TABLET ORAL DAILY
COMMUNITY

## 2024-03-21 NOTE — PROGRESS NOTES
"    [facility] [place] [facility] [place]        Subjective     CC: Follow-up (12 month f/u -- 4 years  s/p right TKA 03/11/2020)/ /)      HPI    Ruthie Starkey is a 78 y.o. female.  Patient returns the office today now 4 years out from right TKA on 3/11/2020.  She is doing great overall.  No complaints.  Patient tells me that her left knee does bother her quite a bit.  She is on oral steroids for PMR.    Overall, patient's symptoms are as above.    ROS:    Constiutional:Pt denies fever, chills, nausea, or vomiting.  MSK:as above        Objective      Past Medical History  Past Medical History:   Diagnosis Date    Arthritis     Glaucoma     Hyperlipidemia     Polymyalgia rheumatica      Social History     Socioeconomic History    Marital status: Single   Tobacco Use    Smoking status: Former     Current packs/day: 0.00     Types: Cigarettes     Start date: 1989     Quit date: 2009     Years since quitting: 15.2     Passive exposure: Past    Smokeless tobacco: Never    Tobacco comments:     socially    Vaping Use    Vaping status: Never Used   Substance and Sexual Activity    Alcohol use: Yes     Comment: social    Drug use: Never    Sexual activity: Defer          Physical Exam  /84   Ht 165.1 cm (65\")   Wt 76.2 kg (168 lb)   BMI 27.96 kg/m²     Body mass index is 27.96 kg/m².    Patient is well nourished and well developed.        Ortho Exam        Lower Extremity:     Inspection and Palpation:      Right knee:  Calf:  Soft and non tender  Effusion:  None  Pulses:  2+  Warmth:  None  Incision:  Healed     ROM:  Right:  Extension:0    Flexion:120      Deformities/Malalignments/Discrepancies:    Right:  none    Functional Testing:  Right:  Straight Leg Raise: 5/5  Patella Tracking:  Normal      Peripheral Vascular:    Upper Extremity:   Inspection:  Left--no cyanotic nail beds Right--no cyanotic nail beds   Bilateral:  Pink nail beds with brisk capillary refill   Palpation:  Bilateral radial pulse " normal    Musculoskeletal:  Global Assessment:  Overall assessment of Lower Extremity Muscle Strength and Tone:  Left quadriceps--5/5   Left hamstrings--5/5       Left tibialis anterior--5/5  Left gastroc-soleus--5/5  Left EHL --5/5    Lower Extremity:  Knee/Patella:  No digital clubbing or cyanosis.    Examination of left knee reveals:  Normal deep tendon reflexes, coordination, strength, tone, sensation.  No known fractures or deformities.    Inspection and Palpation:  Left knee:  Tenderness:  Over the lateral joint line and moderate severity  Effusion:  1+  Crepitus:  Positive  Pulses:  2+  Ecchymosis:  None  Warmth:  None     ROM:  Left:  Extension:5    Flexion:120  Right:  Extension:5     Flexion:120    Instability:    Left:  Lachman Test:  Negative, Varus stress test negative, Valgus stress test negative    Deformities/Malalignments/Discrepancies:    Left:  Genu valgum    Functional Testing:  Jaun's test:  Negative  Patella grind test:  Positive  Q-angle:  normal       Imaging/Labs/EMG Reviewed:  Imaging Results (Last 24 Hours)       Procedure Component Value Units Date/Time    XR Knee 4+ View Left [[id number]] Resulted: 03/21/24 1030     Updated: 03/21/24 1031    Narrative:      Left knee X-Ray    Indication: Pain    Study:  Upright AP, Skiers, Lateral, and Sunrise views of Left knee(s)    Comparison: Left knee 6/2/2022    Findings:    Patient appears to have mild degenerative changes in the medial   compartment.    There are end-stage degenerative changes in the lateral compartment.    There are end-stage changes in the patellofemoral compartment.    Patient has overall valgus alignment.    Kellgren Leo thgthrthathdtheth:th th5th Multiple loose bodies noted posteriorly    Impression:   End-stage lateral compartment and patellofemoral compartment degenerative   changes of the left knee       XR Knee 3+ View With [place]ain Right [[id number]] Resulted: 03/21/24 1029     Updated: 03/21/24 1030    Narrative:      Knee  "X-ray    Indication: status-post TKA    Study:  AP, Lateral, and Sunrise views of Right knee    Comparison: Right knee 3/23/2023    Findings:  No signs of acute fracture are visualized  No signs of loosening are appreciated  Components are well aligned    Impression:  Status post Right cemented total knee arthroplasty. No signs   of loosening or fracture.                Assessment    Assessment:  1. Status post total right knee replacement    2. Primary osteoarthritis of left shoulder    3. Left knee pain, unspecified chronicity        Plan:  Recommend over the counter anti-inflammatories for pain and/or swelling  Status post right TKA--patient is doing great overall.  Continue indefinite antibiotic prophylaxis.  Follow-up in year with x-rays or sooner if necessary  End-stage left knee arthritis, valgus pattern--radiographs are severe enough to merit arthroplasty.  Symptoms right now are under control with her oral steroids.  She does not want to \"overdose on steroids\".  If she requests, an injection can be given to her.  Will leave follow-up open-ended for the left knee at about a year as well.      Remington Jeong MD  03/21/24  14:29 EDT      Dictated Utilizing Dragon Dictation.  "

## 2024-09-13 ENCOUNTER — TELEPHONE (OUTPATIENT)
Age: 79
End: 2024-09-13
Payer: MEDICARE

## 2024-09-13 NOTE — TELEPHONE ENCOUNTER
----- Message from Remington Jeong sent at 9/13/2024 10:04 AM EDT -----  Would you mind calling this patient and giving her an appointment to see me at 3 PM on 9/17/2024?  Needs an injection in the knee before she goes on a trip to [place].    Thank you    MARLEE

## 2024-09-19 ENCOUNTER — OFFICE VISIT (OUTPATIENT)
Dept: ORTHOPEDIC SURGERY | Facility: CLINIC | Age: 79
End: 2024-09-19
Payer: MEDICARE

## 2024-09-19 VITALS
WEIGHT: 167.99 LBS | HEIGHT: 65 IN | SYSTOLIC BLOOD PRESSURE: 132 MMHG | BODY MASS INDEX: 27.99 KG/M2 | DIASTOLIC BLOOD PRESSURE: 74 MMHG

## 2024-09-19 DIAGNOSIS — M17.12 PRIMARY OSTEOARTHRITIS OF LEFT KNEE: Primary | ICD-10-CM

## 2024-09-19 RX ORDER — LIDOCAINE HYDROCHLORIDE 10 MG/ML
3 INJECTION, SOLUTION EPIDURAL; INFILTRATION; INTRACAUDAL; PERINEURAL
Status: COMPLETED | OUTPATIENT
Start: 2024-09-19 | End: 2024-09-19

## 2024-09-19 RX ADMIN — LIDOCAINE HYDROCHLORIDE 3 ML: 10 INJECTION, SOLUTION EPIDURAL; INFILTRATION; INTRACAUDAL; PERINEURAL at 09:20

## 2024-12-09 ENCOUNTER — OFFICE VISIT (OUTPATIENT)
Dept: ORTHOPEDIC SURGERY | Facility: CLINIC | Age: 79
End: 2024-12-09
Payer: MEDICARE

## 2024-12-09 VITALS — BODY MASS INDEX: 27.96 KG/M2 | HEIGHT: 65 IN | DIASTOLIC BLOOD PRESSURE: 72 MMHG | SYSTOLIC BLOOD PRESSURE: 110 MMHG

## 2024-12-09 DIAGNOSIS — M20.012 MALLET FINGER OF LEFT HAND: Primary | ICD-10-CM

## 2024-12-09 DIAGNOSIS — M79.642 LEFT HAND PAIN: ICD-10-CM

## 2024-12-09 PROCEDURE — 99214 OFFICE O/P EST MOD 30 MIN: CPT | Performed by: STUDENT IN AN ORGANIZED HEALTH CARE EDUCATION/TRAINING PROGRAM

## 2024-12-09 PROCEDURE — 1159F MED LIST DOCD IN RCRD: CPT | Performed by: STUDENT IN AN ORGANIZED HEALTH CARE EDUCATION/TRAINING PROGRAM

## 2024-12-09 PROCEDURE — 1160F RVW MEDS BY RX/DR IN RCRD: CPT | Performed by: STUDENT IN AN ORGANIZED HEALTH CARE EDUCATION/TRAINING PROGRAM

## 2024-12-09 NOTE — PROGRESS NOTES
[facility]     Office Visit       Date: 12/09/2024   Patient Name: Ruthie Starkey  MRN: [id number]  YOB: 1945    Referring Physician: No ref. provider found     Chief Complaint:   Chief Complaint   Patient presents with    Left Hand - Pain     Long finger      History of Present Illness:   Ruthie Starkey is a 79 y.o. female presenting to clinic as a new patient with complaints of left long finger pain.  Patient reports that on 12/2/2024 she injured her long finger while using a ladder.  She felt immediate pain and swelling.  She presented to an outside facility where x-rays were obtained and she was placed in a splint and instructed to follow-up.  She reports 5/10 pain and swelling to the digit.  No reinjury.  No other complaints or concerns.    PMH: Hyperlipidemia, PMR    Subjective   Review of Systems:   Review of Systems   Constitutional:  Negative for chills, fever, unexpected weight gain and unexpected weight loss.   HENT:  Negative for congestion, postnasal drip and rhinorrhea.    Eyes:  Negative for blurred vision.   Respiratory:  Negative for shortness of breath.    Cardiovascular:  Negative for leg swelling.   Gastrointestinal:  Negative for abdominal pain, nausea and vomiting.   Genitourinary:  Negative for difficulty urinating.   Musculoskeletal:  Positive for arthralgias. Negative for gait problem, joint swelling and myalgias.   Skin:  Negative for skin lesions and wound.   Neurological:  Negative for dizziness, weakness, light-headedness and numbness.   Hematological:  Does not bruise/bleed easily.   Psychiatric/Behavioral:  Negative for depressed mood.       Pertinent review of systems per HPI    I reviewed the patient's chief complaint, history of present illness, review of systems, past medical history, surgical history, family history, social history, medications and allergy list in the  "EMR on 12/09/2024 and agree with the findings above.    Objective    Vital Signs:   Vitals:    12/09/24 1044   BP: 110/72   Weight: Comment: patient refused   Height: 165.1 cm (65\")     BMI:      General: No acute distress. Alert and oriented.   Cardiovascular: Palpable radial pulse.   Respiratory: Breathing is nonlabored.     Ortho Exam:  Examination of the left hand demonstrates diffuse nodule formation throughout the PIP and DIP joints.  There is a flexion deformity of the long finger with ecchymosis dorsally over the DIP joint.  Healing laceration noted along the lateral aspect of the nail fold.  No drainage or dehiscence.  Mild erythema.  No subungual hematoma.  The DIP joint of the long finger is passively correctable to full extension but pain is noted.  The remainder of the digit is nontender.  She is able to make a composite fist.  Sensations intact light touch of the hand.  Warm well-perfused distally.    Imaging / Studies:    Imaging Results (Last 24 Hours)       Procedure Component Value Units Date/Time    XR Hand 3+ View Left [[id number]] Resulted: 12/09/24 1119     Updated: 12/09/24 1119    Narrative:      Left Hand X-Ray    Indication: Pain    Views:  PA, Lateral, and Oblique     Comparison:  12/3/2024    Findings:  She is arthritic changes throughout the MCP PIP and DIP joints.    Significant arthritic changes at the thumb CMC and STT joints.  There is a   flexion deformity of the long finger DIP joint with a dorsal distal   phalanx base/osteophyte fracture consistent with a bony mallet injury.             Left long finger x-rays obtained on 12/3/2024 were personally reviewed interpreted by myself.  These demonstrate arthritic change at the PIP and DIP joints.  There is a subtle nondisplaced fracture of the dorsal osteophyte to the distal phalanx consistent with a bony mallet finger.    Assessment / Plan    Assessment/Plan:   Ruthie Starkey is a 79 y.o. female with a left long mallet finger, " DOI 12/3/2024.    I discussed with the patient their clinical and radiographic findings demonstrate a mallet finger. The diagnosis of mallet finger and the treatment options were discussed with the patient today.  I discussed that while this is a relatively simple injury, the treatment can be challenging for the patient.  I discussed that the DIP joint needs to be immobilized in full extension to slight hyperextension for 6 weeks continuously, followed by a splint weaning protocol.  I discussed I like to use a custom thermoplastic splint made by our hand therapist.  I discussed that should the splint come off and the DIP joint droop, time will go back to 0.  Furthermore I discussed there is an option of surgery to place a pin across the DIP joint, however with this treatment modality I still prefer a splint to protect the pin from breaking.  The patient expressed understanding of the options for treatment. They will be placed in a custom DIP extension splint to the affected finger with the PIP joint free. A prescription was provided today in clinic. I discussed the risks/sequela of this treatment including potential skin maceration, a slight bump in the area of the healing tendon injury, and possible residual 10 degree flexion deformity of the DIP joint.  All of the patient's questions were answered to their satisfaction.  I would like to see the patient back in about 2 weeks for a skin check and assessment of splint wear.        ICD-10-CM ICD-9-CM   1. Mallet finger of left hand  M20.012 736.1   2. Left hand pain  M79.642 729.5     Follow Up:   Return in about 2 weeks (around 12/23/2024) for Follow Up.      Yuki Watkins MD  [facility] Surgeon

## 2024-12-10 ENCOUNTER — TREATMENT (OUTPATIENT)
Dept: PHYSICAL THERAPY | Facility: CLINIC | Age: 79
End: 2024-12-10
Payer: MEDICARE

## 2024-12-10 DIAGNOSIS — M20.012 MALLET FINGER OF LEFT HAND: Primary | ICD-10-CM

## 2024-12-10 PROCEDURE — L3923 HFO WITHOUT JOINTS PRE CST: HCPCS | Performed by: PHYSICAL THERAPIST

## 2024-12-10 NOTE — PROGRESS NOTES
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    Patient was instructed in wearing schedule, precautions and care of the splint during this visit.   Patient was instructed in proper donning/doffing of splint.   Assessment:  Patient was fitted and appropriate splint was fabricated this date.  Patient reported that splint was comfortable and had no complications with the fit of the splint.  Patient was instructed and patient verbalized understanding of precautions, wear and care of the splint.   Patient demonstrated independent donning/doffing of splint during treatment today.  Goals:  Patient was fitted properly with appropriate splint for diagnosis  Patient was educated on precautions, wear schedule and care of splint  Patient demonstrated independence with donning/doffing of the splint.  Splint was provided to Protect Healing Structures, Restrict Mobility, Improve joint alignment.  Plan:  No additional treatment is required for this patient at this time. The patient is therefore discharged from therapy.  Patient advised to contact therapist with any additional questions or concerns regarding the fit and function of the  splint.  Patient will be seen for splint issues as needed   Wear Instructions: Off for hygiene   Instructed in PIPJ flexion to avoid contracture      PT SIGNATURE: GAYLE Bautista PT, DPT  License Number: [id number]  Electronically signed by GAYLE Bautista PT, 12/10/24, 8:28 AM EST    PHYSICIAN: Yuki Watkins MD      DATE:     Please sign and return via fax to [phone].. Thank you, [facility].

## 2024-12-10 NOTE — LETTER
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    Patient was instructed in wearing schedule, precautions and care of the splint during this visit.   Patient was instructed in proper donning/doffing of splint.   Assessment:  Patient was fitted and appropriate splint was fabricated this date.  Patient reported that splint was comfortable and had no complications with the fit of the splint.  Patient was instructed and patient verbalized understanding of precautions, wear and care of the splint.   Patient demonstrated independent donning/doffing of splint during treatment today.  Goals:  Patient was fitted properly with appropriate splint for diagnosis  Patient was educated on precautions, wear schedule and care of splint  Patient demonstrated independence with donning/doffing of the splint.  Splint was provided to Protect Healing Structures, Restrict Mobility, Improve joint alignment.  Plan:  No additional treatment is required for this patient at this time. The patient is therefore discharged from therapy.  Patient advised to contact therapist with any additional questions or concerns regarding the fit and function of the  splint.  Patient will be seen for splint issues as needed   Wear Instructions: Off for hygiene   Instructed in PIPJ flexion to avoid contracture      PT SIGNATURE: GAYLE Bautista PT, DPT  License Number: [id number]  Electronically signed by GAYLE Bautista PT, 12/10/24, 8:28 AM EST    PHYSICIAN: Yuki Watkins MD      DATE:     Please sign and return via fax to [phone].. Thank you, [facility].

## 2024-12-23 ENCOUNTER — OFFICE VISIT (OUTPATIENT)
Dept: ORTHOPEDIC SURGERY | Facility: CLINIC | Age: 79
End: 2024-12-23
Payer: MEDICARE

## 2024-12-23 VITALS
DIASTOLIC BLOOD PRESSURE: 74 MMHG | HEIGHT: 65 IN | WEIGHT: 167 LBS | BODY MASS INDEX: 27.82 KG/M2 | SYSTOLIC BLOOD PRESSURE: 114 MMHG

## 2024-12-23 DIAGNOSIS — M20.012 MALLET FINGER OF LEFT HAND: Primary | ICD-10-CM

## 2024-12-23 NOTE — PROGRESS NOTES
"                                                                 [facility]     Office Visit       Date: 12/23/2024   Patient Name: Ruthie Starkey  MRN: [id number]  YOB: 1945    Referring Physician: No ref. provider found     Chief Complaint:   Chief Complaint   Patient presents with    Follow-up     2 week follow-up: Mallet finger of left hand (DOI: 12/3/24)     History of Present Illness:   Ruthie Starkey is a 79 y.o. female presenting to clinic for follow-up of left long finger soft tissue mallet, DOI 12/3/2024, initiation of full-time extension splinting on 12/9/2024.  Patient has done well since her last clinic visit.  She has been wearing her custom thermoplastic splint.  She has been taking this off to shower and has been unsupported during this time.  She denies any skin breakdown.  No other complaints or concerns.    Subjective   Review of Systems:   Review of Systems   HENT: Negative.     Eyes: Negative.    Respiratory: Negative.     Cardiovascular: Negative.    Gastrointestinal: Negative.    Endocrine: Negative.    Genitourinary: Negative.    Musculoskeletal:  Positive for arthralgias.   Allergic/Immunologic: Negative.    Hematological: Negative.    Psychiatric/Behavioral: Negative.        Pertinent review of systems per HPI    I reviewed the patient's chief complaint, history of present illness, review of systems, past medical history, surgical history, family history, social history, medications and allergy list in the EMR on 12/23/2024 and agree with the findings above.    Objective    Vital Signs:   Vitals:    12/23/24 1058   BP: 114/74   Weight: 75.8 kg (167 lb)   Height: 165.1 cm (65\")     General: No acute distress. Alert and oriented.   Cardiovascular: Palpable radial pulse.   Respiratory: Breathing is nonlabored.   Ortho Exam:  Examination of the left upper extremity demonstrates a 10 degree DIP joint flexion formally.  Patient fails to keep the " digit supported during examination.  This is passively correctable to -5 degrees.  DIP joint is tender to palpation.  Sensations intact light touch throughout the digit.  Warm well-perfused distally.    Imaging / Studies:    Imaging Results (Last 24 Hours)       ** No results found for the last 24 hours. **          Assessment / Plan    Assessment/Plan:   Ruthie Starkey is a 79 y.o. female with left long finger soft tissue mallet, DOI 12/3/2024, initiation of full-time extension splinting on 12/9/2024.    I counseled the patient on appropriate splinting use.  She needs to keep the DIP joint supported in full extension when out of the splint.  She expressed understanding.  She will continue with full-time extension splinting for the next 4 weeks.  I will see her back in 4 weeks for reevaluation.  We may initiate a weaning protocol at that time.  All questions and concerns were addressed.  He was agreeable.      ICD-10-CM ICD-9-CM   1. Mallet finger of left hand  M20.012 736.1     Follow Up:   Return in about 4 weeks (around 1/20/2025) for Follow Up.      Yuki Watkins MD  [facility]

## 2025-01-26 NOTE — PROGRESS NOTES
"                                                                 [facility]     Office Visit       Date: 01/27/2025   Patient Name: Ruthie Starkey  MRN: [id number]  YOB: 1945    Referring Physician: No ref. provider found     Chief Complaint:   Chief Complaint   Patient presents with    Follow-up     1 month recheck- Mallet finger of left hand (DOI: 12/3/24)     History of Present Illness:   Ruthie Starkey is a 79 y.o. female presenting to clinic for follow-up of left long finger soft tissue mallet, DOI 12/3/2024, initiation of full-time extension splinting on 12/9/2024.  The patient reports doing well since her last clinic visit.  She comes in today without her splint and states that she forgot it at home.  She states otherwise she has been wearing it.  She reports 2/10 pain.  There is some stiffness to the PIP joint but otherwise she is doing well.    Subjective   Review of Systems:   Review of Systems   Pertinent review of systems per HPI    I reviewed the patient's chief complaint, history of present illness, review of systems, past medical history, surgical history, family history, social history, medications and allergy list in the EMR on 01/27/2025 and agree with the findings above.    Objective    Vital Signs:   Vitals:    01/27/25 0824   Weight: 74.8 kg (164 lb 14.5 oz)   Height: 165.1 cm (65\")     General: No acute distress. Alert and oriented.   Cardiovascular: Palpable radial pulse.   Respiratory: Breathing is nonlabored.   Ortho Exam:  Examination of the left upper extremity demonstrates 5 to 10 degree flexion deformity of the DIP joint with prominence dorsally and mild erythema.  Clinical deviation is noted.  She is minimally tender to the DIP joint dorsally.  She is 1 cm tip to palm with composite fist.  Sensation is intact to light touch at the digit.  Warm and well-perfused distally.    Imaging / Studies:    Imaging Results (Last 24 Hours)       ** No " results found for the last 24 hours. **          Assessment / Plan    Assessment/Plan:   Ruthie Starkey is a 79 y.o. female with left long mallet finger, DOI 12/3/2024, initiation of full-time extension splinting on 12/9/2024.     The patient has completed a course of DIP joint extension splinting.  Although I am suspicious that this has not been worn at all times as she did not come into clinic with the splint on today.  Regardless, we will proceed with a weaning protocol.  She is to wear her splint at night for the next 2 weeks.  She may begin working on motion and return to her activities as she tolerates.  I will see her back in 6 weeks for reevaluation of her pain and motion.  All questions and concerns were addressed.  She is agreeable.      ICD-10-CM ICD-9-CM   1. Mallet finger of left hand  M20.012 736.1     Follow Up:   Return in about 6 weeks (around 3/10/2025) for Follow Up.      Yuki Watkins MD  [facility]

## 2025-01-27 ENCOUNTER — OFFICE VISIT (OUTPATIENT)
Dept: ORTHOPEDIC SURGERY | Facility: CLINIC | Age: 80
End: 2025-01-27
Payer: MEDICARE

## 2025-01-27 VITALS — BODY MASS INDEX: 27.47 KG/M2 | HEIGHT: 65 IN | WEIGHT: 164.9 LBS

## 2025-01-27 DIAGNOSIS — M20.012 MALLET FINGER OF LEFT HAND: Primary | ICD-10-CM

## 2025-01-27 PROCEDURE — 1159F MED LIST DOCD IN RCRD: CPT | Performed by: STUDENT IN AN ORGANIZED HEALTH CARE EDUCATION/TRAINING PROGRAM

## 2025-01-27 PROCEDURE — 99213 OFFICE O/P EST LOW 20 MIN: CPT | Performed by: STUDENT IN AN ORGANIZED HEALTH CARE EDUCATION/TRAINING PROGRAM

## 2025-01-27 PROCEDURE — 1160F RVW MEDS BY RX/DR IN RCRD: CPT | Performed by: STUDENT IN AN ORGANIZED HEALTH CARE EDUCATION/TRAINING PROGRAM

## 2025-03-10 ENCOUNTER — OFFICE VISIT (OUTPATIENT)
Dept: ORTHOPEDIC SURGERY | Facility: CLINIC | Age: 80
End: 2025-03-10
Payer: MEDICARE

## 2025-03-10 VITALS — WEIGHT: 164 LBS | HEIGHT: 65 IN | BODY MASS INDEX: 27.32 KG/M2

## 2025-03-10 DIAGNOSIS — M20.012 MALLET FINGER OF LEFT HAND: Primary | ICD-10-CM

## 2025-03-10 RX ORDER — PREDNISONE 5 MG/1
5 TABLET ORAL
COMMUNITY
Start: 2025-02-18

## 2025-03-10 NOTE — PROGRESS NOTES
[facility]     Office Visit       Date: 03/10/2025   Patient Name: Ruthie Starkey  MRN: [id number]  YOB: 1945    Referring Physician: No ref. provider found     Chief Complaint:   Chief Complaint   Patient presents with    Follow-up     6 week follow-up: Mallet finger of left hand      History of Present Illness:   Ruthie Starkey is a 79 y.o. female presenting to clinic for follow-up of left long finger soft tissue mallet, DOI 12/3/2024, initiation of full-time extension splinting on 12/9/2024.  Patient reports doing well since her last clinic visit.  She has discontinued all splinting.  She is using the digit without restriction.  She reports mild 2/10 pain occasionally with some associated stiffness but overall feels improved.    Subjective   Review of Systems:   Review of Systems   Constitutional: Negative.  Negative for chills, fatigue and fever.   HENT: Negative.  Negative for congestion and dental problem.    Eyes: Negative.  Negative for blurred vision.   Respiratory: Negative.  Negative for shortness of breath.    Cardiovascular: Negative.  Negative for leg swelling.   Gastrointestinal: Negative.  Negative for abdominal pain.   Endocrine: Negative.  Negative for polyuria.   Genitourinary: Negative.  Negative for difficulty urinating.   Musculoskeletal:  Positive for arthralgias.   Skin: Negative.    Allergic/Immunologic: Negative.    Neurological: Negative.    Hematological: Negative.  Negative for adenopathy.   Psychiatric/Behavioral: Negative.  Negative for behavioral problems.       Pertinent review of systems per HPI    I reviewed the patient's chief complaint, history of present illness, review of systems, past medical history, surgical history, family history, social history, medications and allergy list in the EMR on 03/10/2025 and agree with the findings above.    Objective    Vital Signs:  "  Vitals:    03/10/25 0754   Weight: 74.4 kg (164 lb)  Comment: Patient reported   Height: 165.1 cm (65\")     General: No acute distress. Alert and oriented.   Cardiovascular: Palpable radial pulse.   Respiratory: Breathing is nonlabored.   Ortho Exam:  Examination of the left long finger demonstrates out of formation with deviation of the DIP joint.  No significant swelling or erythema.  She is mildly tender diffusely throughout the DIP joint.  She is able make a full composite fist.  Sensations intact to light touch throughout the hand.  Warm well-perfused distally.    Imaging / Studies:    Imaging Results (Last 24 Hours)       ** No results found for the last 24 hours. **          Assessment / Plan    Assessment/Plan:   Ruthie Starkey is a 79 y.o. female  with left long mallet finger, DOI 12/3/2024, initiation of full-time extension splinting on 12/9/2024.     Patient has done well since her injury and has completed a course of splinting.  She has full painless range of motion.  She does have some underlying DIP joint arthritis that may contributing to some of her residual pain.  She does not feel that this is bothersome to pursue any additional treatments at this time.  Therefore she may call the office as needed if symptoms worsen or fail to improve.  All question and concerns were addressed.  She is agreeable.      ICD-10-CM ICD-9-CM   1. Mallet finger of left hand  M20.012 736.1     Follow Up:   Return if symptoms worsen or fail to improve.      Yuki Watkins MD  [facility] Hand Surgeon  "

## 2025-03-25 ENCOUNTER — OFFICE VISIT (OUTPATIENT)
Dept: ORTHOPEDIC SURGERY | Facility: CLINIC | Age: 80
End: 2025-03-25
Payer: MEDICARE

## 2025-03-25 VITALS
HEIGHT: 65 IN | SYSTOLIC BLOOD PRESSURE: 118 MMHG | BODY MASS INDEX: 27.32 KG/M2 | WEIGHT: 164 LBS | DIASTOLIC BLOOD PRESSURE: 72 MMHG

## 2025-03-25 DIAGNOSIS — M25.562 LEFT KNEE PAIN, UNSPECIFIED CHRONICITY: ICD-10-CM

## 2025-03-25 DIAGNOSIS — M17.12 PRIMARY OSTEOARTHRITIS OF LEFT KNEE: ICD-10-CM

## 2025-03-25 DIAGNOSIS — Z96.651 STATUS POST TOTAL RIGHT KNEE REPLACEMENT: Primary | ICD-10-CM

## 2025-03-25 PROCEDURE — 99213 OFFICE O/P EST LOW 20 MIN: CPT | Performed by: ORTHOPAEDIC SURGERY

## 2025-03-25 NOTE — PROGRESS NOTES
"    [facility] Note        Subjective     CC: Follow-up ( Month follow-up: 5 years S/P Right TKA (3/11/20))      HPI    Ruthie Starkey is a 79 y.o. female.  Patient is here today now 5 years out from cemented right TKA for valgus deformity on 3/11/2020.  She tells me she is doing great from the right knee.  With regards to her left knee, she notices episodes of the knee giving out.  Steroid injection has not helped her all that much.  She is inquiring about viscosupplementation today.  Granddaughter is getting  in 2025 and she tells me she has to be on her game.  Because of her inflammatory arthropathy, she is on 5 mg of prednisone daily.    Overall, patient's symptoms are as above    ROS:    Constiutional:Pt denies fever, chills, nausea, or vomiting.  MSK:as above        Objective      Past Medical History  Past Medical History:   Diagnosis Date    Arthritis     Glaucoma     Hyperlipidemia     Polymyalgia rheumatica      Social History     Socioeconomic History    Marital status: Single   Tobacco Use    Smoking status: Former     Current packs/day: 0.00     Types: Cigarettes     Start date:      Quit date:      Years since quittin.2     Passive exposure: Past    Smokeless tobacco: Never    Tobacco comments:     socially    Vaping Use    Vaping status: Never Used   Substance and Sexual Activity    Alcohol use: Yes     Comment: social    Drug use: Never    Sexual activity: Defer          Physical Exam  /72   Ht 165.1 cm (65\")   Wt 74.4 kg (164 lb) Comment: Patient reported  BMI 27.29 kg/m²     Body mass index is 27.29 kg/m².    Patient is well nourished and well developed.        Ortho Exam        Lower Extremity:     Inspection and Palpation:      Right knee:  Calf:  Soft and non tender  Effusion:  None  Pulses:  2+  Warmth:  None  Incision:  Healed     ROM:  Right:  Extension:0    Flexion:120      Deformities/Malalignments/Discrepancies:    Right:  " none    Functional Testing:  Right:  Straight Leg Raise: 5/5  Patella Tracking:  Normal      Peripheral Vascular:    Upper Extremity:   Inspection:  Left--no cyanotic nail beds Right--no cyanotic nail beds   Bilateral:  Pink nail beds with brisk capillary refill   Palpation:  Bilateral radial pulse normal    Musculoskeletal:  Global Assessment:  Overall assessment of Lower Extremity Muscle Strength and Tone:  Left quadriceps--5/5   Left hamstrings--5/5       Left tibialis anterior--5/5  Left gastroc-soleus--5/5  Left EHL --5/5    Lower Extremity:  Knee/Patella:  No digital clubbing or cyanosis.    Examination of left knee reveals:  Normal deep tendon reflexes, coordination, strength, tone, sensation.  No known fractures or deformities.    Inspection and Palpation:  Left knee:  Tenderness:  Over the lateral joint line and moderate severity  Effusion:  1+  Crepitus:  Positive  Pulses:  2+  Ecchymosis:  None  Warmth:  None     ROM:  Left:  Extension:5    Flexion:120    Instability:    Left:  Lachman Test:  Negative, Varus stress test negative, Valgus stress test negative    Deformities/Malalignments/Discrepancies:    Left:  Genu valgum    Functional Testing:  Jaun's test:  Negative  Patella grind test:  Positive  Q-angle:  normal       Imaging/Labs/EMG Reviewed and Interpreted:  Imaging Results (Last 24 Hours)       Procedure Component Value Units Date/Time    XR Knee 4+ View Left [[id number]] Resulted: 03/25/25 0903     Updated: 03/25/25 0903    Narrative:      Knee X-Ray    Indication: Pain    Study:  Upright AP, Skiers, Lateral, and Sunrise views of Left knee(s)    Comparison: Left knee 3/21/2024    Findings:    Patient appears to have mild degenerative changes in the medial   compartment.    There are severe degenerative changes in the lateral compartment.    There are severe changes in the patellofemoral compartment.    Patient has overall valgus alignment.    Kellgren Leo rdgrdrrdarddrderd:rd rd3rd Impression:   Severe  lateral compartment and patellofemoral compartment degenerative   changes of the left knee       XR Knee 3+ View With [place] Right [[id number]] Resulted: 03/25/25 0902     Updated: 03/25/25 0902    Narrative:      Knee X-ray    Indication: status-post TKA    Study:  AP, Lateral, and Sunrise views of Right knee    Comparison: Right knee 3/21/2024    Findings:  No signs of acute fracture are visualized  No signs of loosening are appreciated  Components are well aligned    Impression:  Status post Right cemented total knee arthroplasty. No signs   of loosening or fracture.                Assessment    Assessment:  1. Status post total right knee replacement    2. Left knee pain, unspecified chronicity    3. Primary osteoarthritis of left knee        Plan:  Recommend over the counter anti-inflammatories for pain and/or swelling  Status post right TKA--patient doing very well overall.  Continue indefinite antibiotic prophylaxis.  Left knee arthritis--end-stage valgus pattern.  We will try to get a course of viscosupplementation approved for her.  See her back for injection #1.  Hopefully this gives her meaningful relief before the wedding.  We will see her back just before the wedding and inject her left knee so she can enjoy the events.      Remington Jeong MD  03/25/25  13:20 EDT      Dictated Utilizing Dragon Dictation.

## 2025-04-08 ENCOUNTER — TELEPHONE (OUTPATIENT)
Dept: ORTHOPEDIC SURGERY | Facility: CLINIC | Age: 80
End: 2025-04-08

## 2025-04-08 NOTE — TELEPHONE ENCOUNTER
"Caller: Ruthie Starkey \"Caridad\"    Relationship to patient: Self    Best call back number: [phone]    Chief complaint: LEFT KNEE PAIN    Type of visit: GEL INJ    Requested date: ASAP      Additional notes: PT STATES SHE RECEIVED CALL WHILE OUT OF TOWN THAT GEL INJ WERE APPROVED.     "

## 2025-04-10 ENCOUNTER — CLINICAL SUPPORT (OUTPATIENT)
Dept: ORTHOPEDIC SURGERY | Facility: CLINIC | Age: 80
End: 2025-04-10
Payer: MEDICARE

## 2025-04-10 DIAGNOSIS — M17.12 PRIMARY OSTEOARTHRITIS OF LEFT KNEE: Primary | ICD-10-CM

## 2025-04-10 DIAGNOSIS — M54.32 SCIATICA, LEFT SIDE: ICD-10-CM

## 2025-04-10 RX ORDER — LIDOCAINE HYDROCHLORIDE 10 MG/ML
3 INJECTION, SOLUTION EPIDURAL; INFILTRATION; INTRACAUDAL; PERINEURAL
Status: COMPLETED | OUTPATIENT
Start: 2025-04-10 | End: 2025-04-10

## 2025-04-10 RX ORDER — HYDROCHLOROTHIAZIDE 12.5 MG/1
12.5 TABLET ORAL
COMMUNITY
Start: 2025-04-03

## 2025-04-10 RX ADMIN — LIDOCAINE HYDROCHLORIDE 3 ML: 10 INJECTION, SOLUTION EPIDURAL; INFILTRATION; INTRACAUDAL; PERINEURAL at 11:48

## 2025-04-10 NOTE — PROGRESS NOTES
Procedure Note:    I discussed with the patient the potential benefits of performing a therapeutic injections as well as potential risks including but not limited to infection, swelling, pain, bleeding, bruising, nerve/vessel damage, skin color changes, transient elevation in blood glucose levels, and fat atrophy. After informed consent and after the areas were prepped with chlorhexadine soap, ethyl chloride was used to numb the skin. Via the superiorlateral approach, 3mL of 1% lidocaine followed by Synvisc 3 #1 were each injected into the left knee joint. The patient tolerated the procedure well. There were no complications. A sterile dressing was placed over the injection sites.      Follow-up: 1 week        Patient has a history of low back issues and left-sided sciatica and her friend, Karen Nicholson, has seen Dr. Aarno and been very impressed with him and the patient would like referral to see him.  I will go ahead and place that referral today and notify Dr. Aaron.

## 2025-04-10 NOTE — PROGRESS NOTES
Procedure   - Large Joint Arthrocentesis: L knee on 4/10/2025 11:51 AM  Indications: pain  Details: (23G) needle, superolateral approach  Medications: 3 mL lidocaine PF 1% 1 %; 2 mL Hylan G-F 20 16 MG/2ML  Outcome: tolerated well, no immediate complications  Procedure, treatment alternatives, risks and benefits explained, specific risks discussed. Consent was given by the patient. Immediately prior to procedure a time out was called to verify the correct patient, procedure, equipment, support staff and site/side marked as required. Patient was prepped and draped in the usual sterile fashion.

## 2025-04-10 NOTE — LETTER
April 10, 2025     Alejandro Aaron MD  [id number]  [place] [address]    Patient: Ruthie Starkey   YOB: 1945   Date of Visit: 4/10/2025     Dear Dr. Galen MD:    Thank you for referring Ruthie Starkey to me for evaluation. Below are the relevant portions of my assessment and plan of care.    If you have questions, please do not hesitate to call me. I look forward to following Ruthie along with you.         Sincerely,        Remington Jeong MD        CC: No Recipients      Progress Notes:  Procedure  Procedures       Procedure  Large Joint Arthrocentesis: L knee  Date/Time: 4/10/2025 11:48 AM  Consent given by: patient  Site marked: site marked  Timeout: Immediately prior to procedure a time out was called to verify the correct patient, procedure, equipment, support staff and site/side marked as required   Supporting Documentation  Indications: pain   Procedure Details  Location: knee - L knee  Preparation: Patient was prepped and draped in the usual sterile fashion  Needle size: 23 G  Approach: anterolateral  Medications administered: 2 mL Hylan G-F 20 16 MG/2ML; 3 mL lidocaine PF 1% 1 %  Patient tolerance: patient tolerated the procedure well with no immediate complications         Procedure Note:    I discussed with the patient the potential benefits of performing a therapeutic injections as well as potential risks including but not limited to infection, swelling, pain, bleeding, bruising, nerve/vessel damage, skin color changes, transient elevation in blood glucose levels, and fat atrophy. After informed consent and after the areas were prepped with chlorhexadine soap, ethyl chloride was used to numb the skin. Via the superiorlateral approach, 3mL of 1% lidocaine followed by Synvisc 3 #1 were each injected into the left knee joint. The patient tolerated the procedure well. There were no complications. A sterile dressing was placed over the injection  sites.      Follow-up: 1 week        Patient has a history of low back issues and left-sided sciatica and her friend, Karen Nicholson, has seen Dr. Aaron and been very impressed with him and the patient would like referral to see him.  I will go ahead and place that referral today and notify Dr. Aaron.

## 2025-04-10 NOTE — PROGRESS NOTES
Procedure   Large Joint Arthrocentesis: L knee  Date/Time: 4/10/2025 11:48 AM  Consent given by: patient  Site marked: site marked  Timeout: Immediately prior to procedure a time out was called to verify the correct patient, procedure, equipment, support staff and site/side marked as required   Supporting Documentation  Indications: pain   Procedure Details  Location: knee - L knee  Preparation: Patient was prepped and draped in the usual sterile fashion  Needle size: 23 G  Approach: anterolateral  Medications administered: 2 mL Hylan G-F 20 16 MG/2ML; 3 mL lidocaine PF 1% 1 %  Patient tolerance: patient tolerated the procedure well with no immediate complications

## 2025-04-16 NOTE — PROGRESS NOTES
Procedure Note:    I discussed with the patient the potential benefits of performing a therapeutic injections as well as potential risks including but not limited to infection, swelling, pain, bleeding, bruising, nerve/vessel damage, skin color changes, transient elevation in blood glucose levels, elevated blood pressure and fat atrophy. After informed consent and after the areas were prepped with chlorhexadine soap, ethyl chloride was used to numb the skin. Via the superiorlateral approach, 3mL of 1% lidocaine followed by Synvisc 3 #2 were each injected into the left knee joint. The patient tolerated the procedure well. There were no complications. A sterile dressing was placed over the injection sites.      Follow-up: 1 week

## 2025-04-17 ENCOUNTER — CLINICAL SUPPORT (OUTPATIENT)
Dept: ORTHOPEDIC SURGERY | Facility: CLINIC | Age: 80
End: 2025-04-17
Payer: MEDICARE

## 2025-04-17 DIAGNOSIS — M17.12 PRIMARY OSTEOARTHRITIS OF LEFT KNEE: Primary | ICD-10-CM

## 2025-04-17 RX ORDER — LIDOCAINE HYDROCHLORIDE 10 MG/ML
3 INJECTION, SOLUTION EPIDURAL; INFILTRATION; INTRACAUDAL; PERINEURAL
Status: COMPLETED | OUTPATIENT
Start: 2025-04-17 | End: 2025-04-17

## 2025-04-17 RX ADMIN — LIDOCAINE HYDROCHLORIDE 3 ML: 10 INJECTION, SOLUTION EPIDURAL; INFILTRATION; INTRACAUDAL; PERINEURAL at 08:34

## 2025-04-17 NOTE — PROGRESS NOTES
Procedure   - Large Joint Arthrocentesis: L knee on 4/17/2025 8:34 AM  Indications: pain  Details: (23g) needle, superior approach  Medications: 3 mL lidocaine PF 1% 1 %; 2 mL Hylan G-F 20 16 MG/2ML  Outcome: tolerated well, no immediate complications  Procedure, treatment alternatives, risks and benefits explained, specific risks discussed. Consent was given by the patient. Immediately prior to procedure a time out was called to verify the correct patient, procedure, equipment, support staff and site/side marked as required. Patient was prepped and draped in the usual sterile fashion.

## 2025-04-24 ENCOUNTER — CLINICAL SUPPORT (OUTPATIENT)
Dept: ORTHOPEDIC SURGERY | Facility: CLINIC | Age: 80
End: 2025-04-24
Payer: MEDICARE

## 2025-04-24 DIAGNOSIS — M17.12 PRIMARY OSTEOARTHRITIS OF LEFT KNEE: Primary | ICD-10-CM

## 2025-04-24 RX ORDER — LIDOCAINE HYDROCHLORIDE 10 MG/ML
3 INJECTION, SOLUTION EPIDURAL; INFILTRATION; INTRACAUDAL; PERINEURAL
Status: COMPLETED | OUTPATIENT
Start: 2025-04-24 | End: 2025-04-24

## 2025-04-24 RX ADMIN — LIDOCAINE HYDROCHLORIDE 3 ML: 10 INJECTION, SOLUTION EPIDURAL; INFILTRATION; INTRACAUDAL; PERINEURAL at 08:31

## 2025-04-24 NOTE — PROGRESS NOTES
Procedure   Large Joint Arthrocentesis: L knee  Date/Time: 4/24/2025 8:31 AM  Consent given by: patient  Site marked: site marked  Timeout: Immediately prior to procedure a time out was called to verify the correct patient, procedure, equipment, support staff and site/side marked as required   Supporting Documentation  Indications: pain   Procedure Details  Location: knee - L knee  Preparation: Patient was prepped and draped in the usual sterile fashion  Needle size: 23 G  Approach: anterolateral  Medications administered: 3 mL lidocaine PF 1% 1 %; 2 mL Hylan G-F 20 16 MG/2ML  Patient tolerance: patient tolerated the procedure well with no immediate complications

## 2025-04-25 NOTE — PROGRESS NOTES
"Chief Complaint: \"Lower back pain.\"      History of Present Illness: Ms. Ruthie Starkey, 79 y.o. female referred by Dr. Remington Jeong in consultation for chronic intractable lower back pain.   Pain History: Ruthie Starkey reports a several year history of chronic intractable lower back pain, which began without incident. She denies previous consultations with pain management. Ruthie Starkey underwent neurosurgical consultation with NICANOR Michaels, [facility] Department of Neurosurgery on 11/12/2024, and was found not to be a surgical candidate. Patient's pain is mainly in her lower back and radiates into the left posterior thigh. She participated in physical therapy in the past but declined participation in physical therapy when she saw [facility] because of her activity level is quite high and she exercises almost on a daily basis (aqua therapy, etc). MRI of the lumbar spine without contrast on October 7, 2024 revealed severe degenerative changes throughout the lumbar spine with disc osteophyte complex formations, facet arthropathy, ligamentum flavum hypertrophy contributing to multilevel canal and foraminal narrowing. Mild rotatory scoliotic curvature. Grade 1 retrolisthesis of T12 on L1, L1 on L2, L2 on L3, and L3 on L4. Grade 1 anterolisthesis of L5 on S1. At L4-L5: Disc osteophyte complex formation, articular facet disease, ligamentum flavum hypertrophy contributing to moderate canal stenosis and moderate to severe right and mild left foraminal stenosis. At L5-S1: Moderate right and mild left foraminal stenosis. Ruthie Starkey has failed to obtain pain relief with conservative measures for more than > 10 years including: oral analgesics, topical analgesics, ice, heat, physical therapy (in the past), physical therapist directed home exercise program HEP (ongoing), independent exercise program (ongoing for the past years), water therapy (ongoing @ [facility]), to " name a few. Pain has progressed in intensity over the past months.  Pain Description: Constant lower back pain with intermittent exacerbation, described as aching, dull, sharp, burning, numbing, and tingling (right foot) sensation.   Radiation of Pain: The pain radiates into the gluteal region and the posterior aspect of the left thigh  Pain intensity today: 7/10   Average pain intensity last week: 7/10  Pain intensity ranges from: 3/10 to 9/10  Aggravating factors: Pain increases with standing, walking. Patient describes neurogenic claudication. Patient does not use a cane or walker.   Alleviating factors: Pain decreases with sitting down, sitting on a recliner, lying down  Associated Symptoms:   Patient reports numbness (right foot) and weakness in the lower extremities.   Patient denies any new bladder or bowel problems.   Patient reports difficulties with her balance but denies recent falls.   Pain interferes with general activities and affects patient's quality of life  Pain interferes with sleep: Falling asleep and causing sleep fragmentation (left gluteal pain)  Muscle spasms: No  Stiffness: LB      Review of previous therapies and additional medical records:  Ruthie Starkey has already failed the following measures, including:   Conservative Measures: Oral analgesics, topical analgesics, ice, heat, physical therapy (in the past), physical therapist directed home exercise program HEP (ongoing), independent exercise program (ongoing for the past years), water therapy (ongoing @ [facility])  Interventional Measures:   04/24/2025: Left knee joint Synvisc #3 .   04/17/2025: Left knee joint Synvisc #2   04/10/2025: Left knee joint Synvisc #1  Surgical Measures: No history of previous cervical spine, lumbar spine or hip surgery. 03/11/2020: Right Total knee arthroplasty  Ruthie Starkey underwent neurosurgical consultation with NICAONR Michaels, [facility] Department of Neurosurgery on  11/12/2024, and was found not to be a surgical candidate.  Ruthie Starkey presents with significant comorbidities including HTN, hyperlipidemia, GERD, glaucoma, polymyalgia rheumatica, rheumatoid arthritis, osteoporosis   In terms of current analgesics, Ruthie Starkey takes: prednisone    I have reviewed PDMP/Manjit Report consistent with medication reconciliation.  SOAPP/ORT: Low Risk     PHQ-2 Depression Screening  Little interest or pleasure in doing things? Not at all   Feeling down, depressed, or hopeless? Not at all   PHQ-2 Total Score 0        Pain Self-Efficacy Questionnaire (PSEQ)   ITEM: Scale  0 = Does not agree   6 = Agree  04-29 2025        I can enjoy things despite the pain. 5        I can do most of the household chores (tidying up, washing dishes, etc) despite the pain. 5        I can socialize with my friends or family members as often as I used to do despite the pain. 6        I can cope with my pain in most situations. 6        I can do some form of work despite the pain (housework, paid and unpaid work). 6        I can still do many of the things I enjoy doing (hobbies, leisure activities, etc) despite pain. 5        I can cope with my pain without medications. 5        I can accomplish most of my goals in life despite the pain. 5        I can live in a normal lifestyle, despite the pain. 5        I can gradually become more active despite the pain. 4        TOTAL SCORE 52/60            Global Pain Scale 04-29 2025          Pain 18          Feelings 5          Clinical outcomes 10          Activities 10          GPS Total: 43            The [place] Back Pain Disability Scale    DATE 04-29 2025          Sleep through the night 4          Turn over in bed 2          Get out of bed 1          Make your bed 1          Put on socks (pantyhose) 1          Ride in a car 0          Sit in a chair for several hours 2          Stand up for 20-30 minutes 2          Climb one flight of  stairs 2          Walk a few blocks (200-300 yards)  3          Walk several miles 5          Run one block (about 50 yards) 5          Take food out of the refrigerator 0          Reach up to high shelves 0          Move a chair 1          Pull or push heavy doors 0          Bend over to clean the bathtub 0          Throw a ball 0          Carry two bags of groceries 1          Lift and carry a heavy suitcase 4          Total score 34            Castro Claudication Score  All questions are in reference to an average for the past month 04-29 2025          PAIN FREQUENCY:   How often have you experienced pain in your back or buttock or pain that goes down your back, buttock, and/or legs?  Not at all  Less than once a week  At least once a week  Every day for at least a few minutes  Every day for most of the day  Every minute of the day 4          PAIN SEVERITY:   How would you describe the worst pain you have had in your back, buttock, and/or legs?  0. None  1. Mild  2. Moderate  3. Severe  4. Very severe  5. Intolerable 4          BACK PAIN SEVERITY:   How would you describe the pain or discomfort in your back and/or buttocks?  0. None  1. Mild  2. Moderate  3. Severe  4. Very severe  5. Intolerable 4          LEG PAIN SEVERITY:   How would you describe the pain or discomfort in your legs or feet?  0. None  1. Mild  2. Moderate  3. Severe  4. Very severe  5. Intolerable 3          NERVE SYMPTOM SEVERITY:   How would you describe the numbness or tingling in your legs or feet?  0. None  1. Mild  2. Moderate  3. Severe  4. Very severe  5. Intolerable 1          LEG WEAKNESS:   How would you describe the strength in your legs, ankles, or feet?  0. None  1. Mild  2. Moderate  3. Severe  4. Very severe  5. Intolerable 2          BALANCE:   Which statement best describes your steadiness when standing or walking?  0. I have no problems with my balance.  1. I sometimes feel off-balance but I am able to walk without using a  gait aid.  2. I feel off-balance but I can walk with a gait aid.  3. I am unable to walk without an aid.  4. I have difficulty walking despite using an aid.  5. I cannot stand up. 1          WALKING DISTANCE:   When you went for a walk, how far were you able to walk before your back or leg started giving you trouble?  0. No limits or more than 2 miles   1. More than 1/4 mile but less than 2 miles  2. More than 100 yards but less than 1/4 mile  3. More than 50 feet but less than 100 yards  4. Less than 50 feet  5. Not at all 3          WALKING ABILITY:   Which statement best describes your walking ability?  0. There is no limit to my walking ability.  1. I walk far enough to do everything I want to do.  2. I am able to walk comfortably from my home to the shops or my transport.  3. I am able to walk comfortably around the house.  4. I am able to walk only from the bedroom to the bathroom or kitchen.  5. I am not able to walk at all. 1          WALKING SPEED:   Which statement best describes your walking?  0. I walk at a normal speed and standing upright.  1. I walk slowly but standing upright.  2. I walk slowly and bent forward.  3. I have to stop and stand still when I walk.  4. I have to stop and sit down when I walk.  5. I cannot walk at all. 4          Total Score: Total Score _____% = (___) x 100                                                              50  27            Review of Diagnostic Studies:  I have independently reviewed and interpreted the images with the patient and used the images and a tridimensional spine model to explain findings. I have also reviewed the reports.  MRI of the lumbar spine without contrast on October 7, 2024. Mild rotatory scoliotic curvature of the lumbar spine. Small amount of acute endplate edema most from L2-L3 to L5-S1 where there is likely ongoing bony endplate remodeling (Modic type I changes). There is also bone edema noted along the anterior inferior endplate of T11 in  particular on the left side which is presumably degenerative. Grade 1 retrolisthesis of T12 on L1, L1 on L2, L2 on L3, and L3 on L4. Grade 1 anterolisthesis of L5 on S1. Several disc osteophyte complex formations, facet arthropathy contributing to canal and foraminal narrowing.  Axial imaging:  T12-L1: Small disc bulge, moderate articular facet disease, ligamentum flavum hypertrophy. No significant canal or foraminal stenosis.  L1-L2: Disc osteophyte complex formation, articular facet disease. Mild canal and mild-to-moderate left foraminal stenosis. Mild right foraminal stenosis.  L2-L3: Disc osteophyte complex formation, articular facet disease. Mild canal stenosis. Moderate foraminal stenosis.  L3-L4: Disc osteophyte complex formation, articular facet disease. Mild canal stenosis. Moderate foraminal stenosis.  L4-L5: Disc osteophyte complex formation, articular facet disease, ligamentum flavum hypertrophy. Moderate canal stenosis. Moderate to severe right and mild left foraminal stenosis.   L5-S1: Disc osteophyte complex formation, articular facet disease. Moderate right and mild left foraminal stenosis.    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    Primary osteoarthritis of left knee    Physical deconditioning    Gait disturbance    Greater trochanteric bursitis of left hip       Past Medical History:   Diagnosis Date    Arthritis     Glaucoma     Hyperlipidemia     Polymyalgia rheumatica          Past Surgical  "History:   Procedure Laterality Date    COLONOSCOPY      EYE SURGERY      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    SKIN CANCER EXCISION      right thigh,     TONSILLECTOMY      TOTAL KNEE ARTHROPLASTY Right 3/11/2020    Procedure: TOTAL KNEE ARTHROPLASTY RIGHT;  Surgeon: Remington Jeong MD;  Location: [facility];  Service: Orthopedics;  Laterality: Right;    TUBAL ABDOMINAL LIGATION      WISDOM TOOTH EXTRACTION           Family History   Problem Relation Age of Onset    Hypertension Mother     Heart disease Mother     Osteoarthritis Father     Heart disease Father          Social History     Socioeconomic History    Marital status: Single   Tobacco Use    Smoking status: Former     Current packs/day: 0.00     Types: Cigarettes     Start date:      Quit date:      Years since quittin.3     Passive exposure: Past    Smokeless tobacco: Never    Tobacco comments:     socially    Vaping Use    Vaping status: Never Used   Substance and Sexual Activity    Alcohol use: Yes     Comment: social    Drug use: Never    Sexual activity: Defer           Current Outpatient Medications:     dorzolamide (TRUSOPT) 2 % ophthalmic solution, , Disp: , Rfl:     ezetimibe (ZETIA) 10 MG tablet, Take 1 tablet by mouth Daily., Disp: , Rfl:     hydroCHLOROthiazide 12.5 MG tablet, Take 1 tablet by mouth., Disp: , Rfl:     Kevzara 200 MG/1.14ML solution auto-injector, Inject 1.14 mL under the skin into the appropriate area as directed., Disp: , Rfl:     omeprazole (priLOSEC) 40 MG capsule, , Disp: , Rfl:     pravastatin (PRAVACHOL) 20 MG tablet, , Disp: , Rfl:     predniSONE (DELTASONE) 5 MG tablet, 1 tablet., Disp: , Rfl:     valsartan-hydrochlorothiazide (DIOVAN-HCT) 80-12.5 MG per tablet, , Disp: , Rfl:     fluticasone (FLONASE) 50 MCG/ACT nasal spray, Administer 2 sprays into the nostril(s) as directed by provider Daily for 10 days., Disp: 16 g, Rfl: 0      No Known Allergies      Ht 165.1 cm (65\")   Wt 78.9 kg (174 lb)   " "BMI 28.96 kg/m²       Physical Exam:  Constitutional: Patient appears well-developed, well-nourished, well-hydrated, appears younger than stated age  HEENT: Head: Normocephalic and atraumatic  Eyes: Conjunctivae and lids are normal  Pupils: Equal, round, reactive to light  Peripheral vascular exam: Posterior tibialis: right 2+ and left 2+. Dorsalis pedis: right 2+ and left 2+. No edema.   Musculoskeletal   Gait and station: Gait evaluation demonstrated a normal gait. Able to walk on heels, toes, tandem walking   Lumbar Spine: Passive and active range of motion are limited but without pain. Extension, flexion, lateral flexion, rotation of the lumbar spine did not increase or reproduce pain. Lumbar facet joint loading maneuvers are negative.   Multifidus Toe Touch Test MT3: Positive; Prone Instability Test (PIT):  Positive; Multifidus Lift Test (MLT): \"off\"  Sacroiliac Joints Provocative Maneuvers: Negative   Piriformis maneuvers: Negative   Right Hip Joint: The range of motion of the hip joint is limited to flexion and internal rotation but without pain   Left Hip Joint: The range of motion of the hip joint is limited to flexion and internal rotation but without pain   Palpation of the bilateral psoas tendons and iliopsoas bursas: Unrevealing   Palpation of the bilateral greater trochanters: Reveals tenderness on the left> right   Examination of the Iliotibial band: Unrevealing   Neurological:   Patient is alert and oriented to person, place, and time.   Speech: Normal.   Cortical function: Normal mental status.   Reflex Scores:  Right patellar: 0+  Left patellar: 0+  Right Achilles: 0+  Left Achilles: 0+  Motor strength: 5/5  Motor Tone: Normal  Involuntary movements: None.   Superficial/Primitive Reflexes: Primitive reflexes were absent.   Right Flowers: Absent  Left Flowers: Absent  Right ankle clonus: Absent  Left ankle clonus: Absent   Babinsky: Absent  Long tract signs: Negative. Straight leg raising test: " Negative. Femoral stretch sign: Negative.   Sensory exam: Intact to light touch, intact pain and temperature sensation, intact vibration sensation and normal proprioception  Coordination: Finger to nose: Normal. Balance: Normal Romberg's sign: Negative   Skin and subcutaneous tissue: Skin is warm and intact. No rash noted. No cyanosis.   Psychiatric: Judgment and insight: Normal. Recent and remote memory: Intact. Mood and affect: Normal.     Ruthie Starkey   1945    ASSESSMENT:   1. Lumbar stenosis with neurogenic claudication    2. Ligamentum flavum hypertrophy    3. Scoliosis of lumbar spine, unspecified scoliosis type    4. Spondylosis of lumbar region without myelopathy or radiculopathy    5. 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 Greater trochanteric bursitis of left hip    7. Status post total right knee replacement    8. Primary osteoarthritis of left knee    9. Gait disturbance    10. Physical deconditioning        PLAN/MEDICAL DECISION MAKING:  Ms. Ruthie Starkey, 79 y.o. female referred by Dr. Remington Jeong in consultation for chronic intractable lower back pain. Ruthie Starkey underwent neurosurgical consultation with NICANOR Michaels, [facility] Department of Neurosurgery on 11/12/2024, and was found not to be a surgical candidate. MRI of the lumbar spine without contrast on October 7, 2024 revealed severe degenerative changes throughout the lumbar spine with disc osteophyte complex formations, facet arthropathy, ligamentum flavum hypertrophy contributing to multilevel canal and foraminal narrowing. Mild rotatory scoliotic curvature. Grade 1 retrolisthesis of T12 on L1, L1 on L2, L2 on L3, and L3 on L4. Grade 1 anterolisthesis of L5 on S1. At L4-L5: Disc osteophyte complex formation, articular facet disease, ligamentum flavum hypertrophy contributing to moderate canal stenosis and moderate to severe right and mild left foraminal  stenosis. At L5-S1: Moderate right and mild left foraminal stenosis. Ruthie Starkey has failed to obtain pain relief with conservative measures for more than > 10 years including: oral analgesics, topical analgesics, ice, heat, physical therapy (in the past), physical therapist directed home exercise program HEP (ongoing), independent exercise program (ongoing for the past years), water therapy (ongoing @ [facility]), to name a few.  She participated in physical therapy in the past but has declined participation in physical therapy when she saw [facility] because of her activity level is quite high and she exercises almost on a daily basis (aqua therapy, etc). Pain has progressed in intensity over the past months. A comprehensive evaluation--including a detailed history, physical examination, and relevant physiologic and functional assessments--was conducted. The patient presents with intractable pain attributed to the diagnoses listed above. This pain has persisted despite trials of conservative modalities, as documented in the HPI and previous records. The patient's moderate to severe pain significantly impacts daily functioning, activities of daily living (ADLs), and overall quality of life. This is evidenced by results from standardized assessment tools, including:  Global Pain Scale: 43/100  [place] Back Pain Disability Scale: 34/100  [place] Claudication Score: 26/50  Tinetti Gait & Balance Assessment Tool: (low / moderate / high) fall risk  Additionally, I have reviewed relevant diagnostic imaging and studies supporting the chronicity and severity of the patient’s pain condition. I have also reviewed available medical records, including documentation of previous therapies and treatment outcomes.  Psychological screening was also performed:  PHQ-2: 0  Pain Self-Efficacy Questionnaire (PSEQ): 52/60  Pain nature: The patient’s pain is predominantly physical in nature.  I conducted a detailed discussion with Ms.  Ruthie Bowerninoska Starkey regarding the nature of her chronic pain condition and reviewed potential therapeutic options. This conversation included a review of the risks, benefits, alternatives, and the rationale for each approach. We agreed to pursue a stepwise treatment strategy, beginning with the least invasive interventions appropriate to the severity and complexity of the patient’s condition. The proposed treatment plan is consistent with current clinical guidelines and evidence-based standards of care. Its scope, duration, and setting are medically appropriate, with the intent to improve function, reduce pain, and enhance quality of life. Accordingly, I recommend the following plan:    1. Interventional pain management measures: Patient does not take blood thinners. Patient will be scheduled for diagnostic and therapeutic bilateral L4-L5 transforaminal epidural steroid injections using the lowest effective dose of steroids, under C-arm fluoroscopic guidance, with the use of contrast dye unless contraindicated to confirm appropriate needle placement and spread of contrast dye. We may repeat therapeutic bilateral L4-L5 transforaminal epidural steroid injections depending on patient's outcome and following current guidelines. Epidurals will be limited to a maximum of 4 sessions per spinal region in a rolling twelve (12) month period. Continuation of epidural steroid injections over 12 months would only be considered under the following provisions;  Patient is a high-risk surgical candidate, or the patient does not desire surgery, or recurrence of pain in the same location relieved with ESIs for at least three months and epidural provides at least 50% sustained improvement of pain and/or 50% objective improvement in function (using same scale as baseline)  Pain is severe enough to cause a significant degree of functional disability or vocational disability  The primary care provider will be notified regarding  continuation of procedures and repeat steroid use   Other options for treatment of patient's pain would include   MILD procedure as ligamentum flavum hypertrophy is the main component of central canal stenosis  Interspinous spacers: Fernanda  [id number]  Diagnostic superior cluneal nerve blocks by hydrodissection technique under ultrasound guidance, C arm fluoroscopic guidance, and PNS guidance, if beneficial Tx blocks Vs PNS (Curonix, Sprint)  Regenerative therapies  A spinal cord stimulator trial     B. Treatment of Chronic Left Greater Trochanteric Bursitis: The sensory innervation of the greater trochanters is provided by the trochanteric branches of the femoral nerves. These branches are easily targeted for diagnostic blocks and for radiofrequency thermal ablation. We have discussed prospects of a first set of diagnostic nerve blocks of the left trochanteric branches of the femoral nerve. If the patient experiences more than 80% pain relief along with functional improvement and ability to perform activities that otherwise would cause pain, then, the patient will be scheduled for a second set of diagnostic nerve blocks of the left trochanteric branches of the femoral nerve, to then, proceed with bipolar radiofrequency ablation of the left trochanteric branches of the femoral nerves.    2. Diagnostic studies:   A. Lumbar Spine X-rays, full views including flexion and extension to assess lumbar stability  B. Prior to CSS: MRI of the thoracic spine without contrast to assess capacity and patency of the spinal canal and epidural space prior to spinal cord stimulator trial and implant  C. If she continues to struggle, she might need a lumbar myelogram followed by CT post-myelogram and EMG/NCV of the bilateral lower extremities     3. Pharmacological measures: Reviewed and discussed;   A. Patient takes prednisone  B. Trial with Rheumate one tablet once daily  C. Trial with prilocaine 2%, lidocaine 10%, imipramine 3%,  capsaicin 0.001%, and mannitol 20%  cream, apply 1 to 2 grams of cream to the affected areas every 4 to 6 hours as needed  D. We have discussed a trial with duloxetine 20 mg daily for treatment of neuropathic pain, musculoskeletal pain    4. Long-term rehabilitation efforts:  A. The patient does not have a history of falls. I performed a risk assessment for falls using the Tinetti gait & balance assessment tool (scored low risk for falls).   B. Patient will start a comprehensive physical therapy program at [facility] for Alter-G, water therapy, gait and balance training, neurodynamics, core strengthening, gluteal and abductor strengthening, ultrasound, ASTYM, E-STIM, myofascial release, cupping, dry needling, home exercise program, 2-3 x per week for 8 weeks   C. Contrast therapy: Apply ice-packs for 15-20 minutes, followed by heating pads for 15-20 minutes to affected area   D. Continue a low impact exercise program such as water therapy, swimming  E. Prior to SCS/PNS: Referral to Dr. Fazal Sesay for psychological clearance for peripheral nerve stimulation, spinal cord stimulation, intrathecal therapies  F. Ruthie Starkey  reports that she quit smoking about 16 years ago. Her smoking use included cigarettes. She started smoking about 36 years ago. She has been exposed to tobacco smoke. She has never used smokeless tobacco.     5. The patient has been instructed to contact my office with any questions or difficulties. The patient understands the plan and agrees to proceed accordingly.      The patient has a documented plan of care to address chronic pain. Ruthie Starkey reports a pain score of 7/10.  Given her pain assessment as noted, treatment options were discussed and the following options were decided upon as a follow-up plan to address the patient's pain: continuation of current treatment plan for pain, educational materials on pain management, home exercises and therapy,  prescription for non-opiod analgesics, referral to Physical Therapy, referral to specialist for assistance in pain treatment guidance, steroid injections, use of non-medical modalities (ice, heat, stretching and/or behavior modifications), and interventional pain management measures .             Patient does not receive prescriptions for controlled substances from this office. Therefore, a controlled substance agreement is not medically necessary at this time.   LAVELLE query complete. LAVELLE reviewed by Thea Chowdhury MD.     Pain Management Panel           No data to display                 Pain Medications              Kevzara 200 MG/1.14ML solution auto-injector Inject 1.14 mL under the skin into the appropriate area as directed.    predniSONE (DELTASONE) 5 MG tablet 1 tablet.           Orders Placed This Encounter   Procedures    External Facility Surgical/Procedural Request     Standing Status:   Future     Expected Date:   4/29/2025     Expiration Date:   4/29/2026     Provider:   THEA CHOWDHURY [[id number]]     Requested Location:   [address] [place] [address]     Procedure/ Order for Consent:   diagnostic and therapeutic bilateral L4-L5 transforaminal epidural steroid injections     Laterality:   Bilateral     Anesthesia type:   Sedation [260]     Pre-op diagnosis:   Lumbar stenosis with neurogenic claudication     Known Allergy to Latex?:   No [2]    Ambulatory Referral to Physical Therapy for Evaluation & Treatment     Referral Priority:   Routine     Referral Type:   Physical Therapy     Referral Reason:   Specialty Services Required     Requested Specialty:   Physical Therapy     Number of Visits Requested:   1      Please note that portions of this note were completed with a voice recognition program.   Any copied data in any portion of my note from previous notes included in the HPI, PE, MDM and/or assessment and plan has been reviewed by myself and accurate as of this date.   The 21st Century Cures Act  makes medical notes like this available to patients in the interest of transparency. This is a medical document intended as peer to peer communication. It is written in medical language and may contain abbreviations or verbiage that are unfamiliar. It may appear blunt or direct. Medical documents are intended to carry relevant information, facts as evident, and the clinical opinion of the practitioner.     Alejandro Aaron MD    Patient Care Team:  Chastity Cabrales MD as PCP - General (Internal Medicine)     No orders of the defined types were placed in this encounter.        Future Appointments   Date Time Provider Department Center   5/14/2025  9:00 AM Alejandro Aaron MD [facility]   8/26/2025  8:30 AM Remington Jeong MD [facility]

## 2025-04-27 PROBLEM — M24.28 LIGAMENTUM FLAVUM HYPERTROPHY: Status: ACTIVE | Noted: 2025-04-27

## 2025-04-27 PROBLEM — M62.85 DYSFUNCTION OF THE MULTIFIDUS MUSCLE OF LUMBAR REGION: Status: ACTIVE | Noted: 2025-04-27

## 2025-04-27 PROBLEM — M48.062 LUMBAR STENOSIS WITH NEUROGENIC CLAUDICATION: Status: ACTIVE | Noted: 2025-04-27

## 2025-04-27 PROBLEM — M47.816 SPONDYLOSIS OF LUMBAR REGION WITHOUT MYELOPATHY OR RADICULOPATHY: Status: ACTIVE | Noted: 2025-04-27

## 2025-04-27 PROBLEM — M41.9 SCOLIOSIS OF LUMBAR SPINE: Status: ACTIVE | Noted: 2025-04-27

## 2025-04-29 ENCOUNTER — TELEPHONE (OUTPATIENT)
Dept: PAIN MEDICINE | Facility: CLINIC | Age: 80
End: 2025-04-29

## 2025-04-29 ENCOUNTER — HOSPITAL ENCOUNTER (OUTPATIENT)
Dept: GENERAL RADIOLOGY | Facility: HOSPITAL | Age: 80
Discharge: HOME | End: 2025-04-29
Admitting: ANESTHESIOLOGY
Payer: MEDICARE

## 2025-04-29 ENCOUNTER — OFFICE VISIT (OUTPATIENT)
Dept: PAIN MEDICINE | Facility: CLINIC | Age: 80
End: 2025-04-29
Payer: MEDICARE

## 2025-04-29 VITALS — HEIGHT: 65 IN | BODY MASS INDEX: 28.99 KG/M2 | WEIGHT: 174 LBS

## 2025-04-29 DIAGNOSIS — R26.9 GAIT DISTURBANCE: ICD-10-CM

## 2025-04-29 DIAGNOSIS — M48.062 LUMBAR STENOSIS WITH NEUROGENIC CLAUDICATION: ICD-10-CM

## 2025-04-29 DIAGNOSIS — Z96.651 STATUS POST TOTAL RIGHT KNEE REPLACEMENT: ICD-10-CM

## 2025-04-29 DIAGNOSIS — M41.9 SCOLIOSIS OF LUMBAR SPINE, UNSPECIFIED SCOLIOSIS TYPE: ICD-10-CM

## 2025-04-29 DIAGNOSIS — M47.816 SPONDYLOSIS OF LUMBAR REGION WITHOUT MYELOPATHY OR RADICULOPATHY: ICD-10-CM

## 2025-04-29 DIAGNOSIS — M70.62 GREATER TROCHANTERIC BURSITIS OF LEFT HIP: ICD-10-CM

## 2025-04-29 DIAGNOSIS — R53.81 PHYSICAL DECONDITIONING: ICD-10-CM

## 2025-04-29 DIAGNOSIS — M48.062 LUMBAR STENOSIS WITH NEUROGENIC CLAUDICATION: Primary | ICD-10-CM

## 2025-04-29 DIAGNOSIS — M17.12 PRIMARY OSTEOARTHRITIS OF LEFT KNEE: ICD-10-CM

## 2025-04-29 DIAGNOSIS — M62.85 DYSFUNCTION OF THE MULTIFIDUS MUSCLE OF LUMBAR REGION: ICD-10-CM

## 2025-04-29 DIAGNOSIS — M24.28 LIGAMENTUM FLAVUM HYPERTROPHY: ICD-10-CM

## 2025-04-29 PROCEDURE — 72114 X-RAY EXAM L-S SPINE BENDING: CPT

## 2025-04-29 NOTE — TELEPHONE ENCOUNTER
Hub staff attempted to follow warm transfer process and was unsuccessful     Caller: BRANDY    Relationship to patient: Other    Best call back number: [phone]    Patient is needing: [facility] CALLING TO SPEAK WITH CLINCAL MEMBER IN REGARDS TO XRAY ORDER THEY RECEIVED  PLEASE ADVISE AT ABOVE PH#     PATIENT IS AT FACILITY RIGHT NOW

## 2025-04-30 DIAGNOSIS — Z00.8 PRE-SURGICAL PSYCHOLOGICAL ASSESSMENT, ENCOUNTER FOR: ICD-10-CM

## 2025-04-30 DIAGNOSIS — M48.062 LUMBAR STENOSIS WITH NEUROGENIC CLAUDICATION: Primary | ICD-10-CM

## 2025-04-30 RX ORDER — ME-TETRAHYDROFOLATE/B12/HRB236 1-1-500 MG
1 CAPSULE ORAL DAILY
Qty: 90 CAPSULE | Refills: 1 | Status: SHIPPED | OUTPATIENT
Start: 2025-04-30

## 2025-05-08 ENCOUNTER — TELEPHONE (OUTPATIENT)
Dept: PAIN MEDICINE | Facility: CLINIC | Age: 80
End: 2025-05-08
Payer: MEDICARE

## 2025-05-08 NOTE — TELEPHONE ENCOUNTER
"    Caller: Ruthie Starkey \"Caridad\"    Relationship: Self    Best call back number: [phone]    What orders are you requesting (i.e. lab or imaging): MRI        Additional notes: SPOKE WITH PT - SHE STATES THAT SOMEONE IN DR. CHOWDHURY'S OFFICE CALLED HER ABOUT SCHEDULING AN APPOINTMENT FOR AN MRI. PT STATES THAT SHE ADVISED THAT SHE HAS ALREADY HAD AN MRI AND WAS NOT HAVING ANOTHER MRI DONE    PT STATES THAT NO HAS CALLED HER BACK       PLEASE CONTACT PT AND ADVISE        "

## 2025-05-12 ENCOUNTER — TELEPHONE (OUTPATIENT)
Dept: PAIN MEDICINE | Facility: CLINIC | Age: 80
End: 2025-05-12
Payer: MEDICARE

## 2025-05-12 NOTE — TELEPHONE ENCOUNTER
PATIENT CAME IN, HAS NOT RECEIVED A RETURN CALL FROM OUR OFFICE SINCE THURS. THINKS MRI IS DUPLICATE TO WHAT SHE HAD IN PAST. SHE IS SUPPOSED TO HAVE PROCEDURE ON WEDNESDAY. DID NOT UNDERSTAND THE DR. YANCEY VISIT, AND STATES THINGS WERE NOT EXPLAINED REGARDING HER PROCEDURE. SENT MESSAGE TO MANAGER OF PAIN MGMT, I TOLD PATIENT THE MANAGER WOULD CALL HER, GAGE BOWIE MET WITH PATIENT.

## 2025-05-13 NOTE — TELEPHONE ENCOUNTER
S/w patient, accompanied by Dr Aaron. Patient has opted to r/s her procedure from 5/17 to 5/21. She does not wish to have thoracic MRI at this time.

## 2025-05-13 NOTE — TELEPHONE ENCOUNTER
Called and spoke with patient. She stated did did not understand the directions and was left in the dark about what she needed to do. I expressed my apologies. Patient stated she can not have another MRI unless its an open MRI and still then she does not want to do it. I gave information to [facility] to reach out to patient.

## 2025-05-21 ENCOUNTER — TELEPHONE (OUTPATIENT)
Dept: PAIN MEDICINE | Facility: CLINIC | Age: 80
End: 2025-05-21

## 2025-05-21 ENCOUNTER — TELEPHONE (OUTPATIENT)
Dept: URGENT CARE | Facility: CLINIC | Age: 80
End: 2025-05-21

## 2025-05-21 NOTE — TELEPHONE ENCOUNTER
Pharmacy called regarding wanting to switch the nasal spray from 2 sprays 2x a day to 1 spray 2x a day. Spoke with prescribing provider he approved the switch

## 2025-05-21 NOTE — TELEPHONE ENCOUNTER
"    Caller: Ruthie Starkey \"Caridad\"    Relationship to patient: Self    Best call back number: [phone]       Type of visit: OUTSIDE FACILITY      If rescheduling, when is the original appointment: 5.21.25     Additional notes:SPOKE WITH PT- SHE WAS NOT FEELING WELL AND HAD TO CANCEL HER PROCEDURE FOR TODAY    PT WOULD LIKE FOR SOMEONE IN DR. CHOWDHURY'S OFFICE TO CALL HER TO RESCHEDULE THE EPIDURAL PROCEDURE    PLEASE CONTACT PT AND ADVISE           "
S/w patient and rescheduled 05/21/2025 procedure to 06/04/2025.     Advised patient of date and location of procedure ([address] [place] [address], [place] [address]), and also that the staff at [facility] will call the day before their appointment date with their arrival time. Patient was provided with [facility]'s phone number, and reminded of NPO status and need for a .       [facility] and Starla notified.   
Plan: Apply the Efudex to the site BID, cover with vaseline QHS.  Return to recheck in 3 weeks, may continue therapy for 1 more week or biopsy if necessary.
Detail Level: Zone

## 2025-06-02 ENCOUNTER — TELEPHONE (OUTPATIENT)
Dept: PAIN MEDICINE | Facility: CLINIC | Age: 80
End: 2025-06-02

## 2025-06-02 NOTE — TELEPHONE ENCOUNTER
"    Caller: Ruthie Starkey \"Caridad\"    Relationship to patient: Self    Best call back number: [phone]    Chief complaint: diagnostic and therapeutic bilateral L4-L5 transforaminal epidural steroid injections     Type of visit: PROCEDURE     Requested date: 06/11/2025     If rescheduling, when is the original appointment: 06/04/2025     Additional notes:PATIENT STATES THAT SHE HAS A RESPIRATORY INFECTION    "

## 2025-06-09 ENCOUNTER — PATIENT MESSAGE (OUTPATIENT)
Dept: PAIN MEDICINE | Facility: CLINIC | Age: 80
End: 2025-06-09
Payer: MEDICARE

## 2025-06-10 ENCOUNTER — TELEPHONE (OUTPATIENT)
Dept: PAIN MEDICINE | Facility: CLINIC | Age: 80
End: 2025-06-10
Payer: MEDICARE

## 2025-06-11 ENCOUNTER — TELEPHONE (OUTPATIENT)
Age: 80
End: 2025-06-11
Payer: MEDICARE

## 2025-06-11 ENCOUNTER — OUTSIDE FACILITY SERVICE (OUTPATIENT)
Dept: PAIN MEDICINE | Facility: CLINIC | Age: 80
End: 2025-06-11
Payer: MEDICARE

## 2025-06-11 ENCOUNTER — DOCUMENTATION (OUTPATIENT)
Dept: PAIN MEDICINE | Facility: CLINIC | Age: 80
End: 2025-06-11

## 2025-06-11 PROCEDURE — 64483 NJX AA&/STRD TFRM EPI L/S 1: CPT | Performed by: ANESTHESIOLOGY

## 2025-06-11 NOTE — TELEPHONE ENCOUNTER
Spoke with Dr. Salcido's office (Mily) after multiple attempts.     Dosing of injection given to Mily, who will relay to Dr. Salcido to determine if any reduction in oral steroid doing is needed.

## 2025-06-11 NOTE — PROGRESS NOTES
PROCEDURE DATE: 06/11/2025      PREOPERATIVE DIAGNOSES:  1. Lumbar stenosis with neurogenic claudication   2. Ligamentum flavum hypertrophy   3. Scoliosis of lumbar spine, unspecified scoliosis type   4. Spondylosis of lumbar region without myelopathy or radiculopathy   5. 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 left greater trochanteric bursitis  7. Primary osteoarthritis of left knee   8. Gait disturbance   9. Physical deconditioning     POSTOPERATIVE DIAGNOSES:  1. Lumbar stenosis with neurogenic claudication   2. Ligamentum flavum hypertrophy   3. Scoliosis of lumbar spine, unspecified scoliosis type   4. Spondylosis of lumbar region without myelopathy or radiculopathy   5. 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 left greater trochanteric bursitis  7. Primary osteoarthritis of left knee   8. Gait disturbance   9. Physical deconditioning     PROCEDURE: Diagnostic and therapeutic bilateral L4-L5 transforaminal epidural steroid injection    ANESTHESIA: Local anesthesia plus IV sedation    COMPLICATIONS: None     EBL: 0    MEDICAL NECESSITY: A comprehensive evaluation, including history and physical exam and pertinent physiological and functional assessment, was performed. The patient presents with intractable pain due to the diagnoses listed above. The patient has failed to respond to conservative modalities including the impact of patient's moderate-to-severe pain contributing to significant impairment in daily activities, ADLs, and a negative impact on quality of life, as referenced under HPI. Supporting diagnostic studies of patient's chronic pain condition have been reviewed. We have discussed using a stepwise approach starting with the shortest or least intense level of treatment, care, or service as determined by the extent required to diagnose and or treat a patient's condition. The treatments proposed are consistent with the patient's medical condition and are known to  be as safe and effective by current guidelines and standard of care. See OV note.     PROCEDURE SUMMARY: After explaining all the risks and benefits of the procedure, informed consent was obtained.  The patient's surgical site was confirmed with the patient and marked in the holding room accordingly. The patient was transferred to the procedure room and placed on the operating room table in the prone position. Time out was completed. Non-invasive monitors were applied. Administration of IV sedation was performed by a nurse, who monitored the patient's level of consciousness and physiological status. Pertinent information was reported on a sedation flow sheet, which is part of the patient's permanent medical records. Start sedation time: 09:20 AM. The patient's vital signs remained within normal limits. The lumbar spine area was prepped and draped in a sterile fashion.  Using C-arm fluoroscopic guidance, the six o'clock area of the bilateral L4 pedicles (targets) were identified. The skin and tissues overlying the targets were anesthetized with 5 ml of 1% lidocaine. Then, 22-gauge styleted needles were advanced into the most posterior and superior portion of the bilateral L4-L5 neural foramina without any difficulties. The patient did not experience paresthesia. AP and lateral X-ray views were obtained confirming appropriate needle placement. Aspiration was negative for blood and/or CSF. One ml of Omnipaque-240 was injected per side, and contrast dye was seen bathing bilateral L4 nerve roots with spread into the epidural space. X-rays were obtained and scanned in the patient's chart. Two ml of a mixture of 0.25% bupivacaine with 4 mg of dexamethasone were injected per side. X-rays were obtained and scanned in the patient's chart. Fluoroscopy was utilized using low-dose of radiation applying collimation, pulsed mode, and shielding following ALARA recommendations. Fluoroscopy time: 12  seconds. End sedation time: 09:26  AM. The patient tolerated the procedure well and without incident.  Upon completion of the procedure, the patient was transferred to the recovery room in stable condition. The patient was discharged from the [facility] neurologically intact and with appropriate discharge instructions. Pain level before procedure: 7-8/10. Pain level after: 0/10.    PLAN:   Follow up in 10 weeks. We may repeat therapeutic bilateral L4-L5 transforaminal epidural steroid injections depending on the patient's outcome and following current guidelines. Other options for treatment of the patient's pain would include   MILD procedure as ligamentum flavum hypertrophy is the main component of central canal stenosis  Interspinous spacers: Fernanda  ReActiv8  Diagnostic superior cluneal nerve blocks by hydrodissection technique under ultrasound guidance, C arm fluoroscopic guidance, and PNS guidance, if beneficial Tx blocks Vs PNS (Curonix, Sprint)  Regenerative therapies  A spinal cord stimulator trial   Treatment of Chronic Left Greater Trochanteric Bursitis: See OV note  The patient has been instructed to contact my office with any questions or difficulties. The patient understands the plan and agrees to proceed accordingly.    Please note that portions of this note were completed with a voice recognition program.       Signatures  Electronically signed by: Alejandro Aaron M.D.; JUNE 11, 2025, 14:01 EST (Author)

## 2025-08-13 DIAGNOSIS — Z96.651 STATUS POST TOTAL RIGHT KNEE REPLACEMENT: Primary | ICD-10-CM

## 2025-08-13 RX ORDER — AMOXICILLIN 500 MG/1
2000 TABLET, FILM COATED ORAL ONCE
Qty: 4 TABLET | Refills: 5 | Status: SHIPPED | OUTPATIENT
Start: 2025-08-13 | End: 2025-08-13

## 2025-08-20 PROBLEM — M43.16 SPONDYLOLISTHESIS OF LUMBAR REGION: Status: ACTIVE | Noted: 2025-08-20

## 2025-08-21 ENCOUNTER — TELEMEDICINE (OUTPATIENT)
Dept: PAIN MEDICINE | Facility: CLINIC | Age: 80
End: 2025-08-21
Payer: MEDICARE

## 2025-08-21 VITALS — BODY MASS INDEX: 27.49 KG/M2 | WEIGHT: 165 LBS | HEIGHT: 65 IN

## 2025-08-21 DIAGNOSIS — M41.9 SCOLIOSIS OF LUMBAR SPINE, UNSPECIFIED SCOLIOSIS TYPE: ICD-10-CM

## 2025-08-21 DIAGNOSIS — R53.81 PHYSICAL DECONDITIONING: ICD-10-CM

## 2025-08-21 DIAGNOSIS — M17.12 PRIMARY OSTEOARTHRITIS OF LEFT KNEE: ICD-10-CM

## 2025-08-21 DIAGNOSIS — M70.62 GREATER TROCHANTERIC BURSITIS OF LEFT HIP: ICD-10-CM

## 2025-08-21 DIAGNOSIS — R26.9 GAIT DISTURBANCE: ICD-10-CM

## 2025-08-21 DIAGNOSIS — M43.16 SPONDYLOLISTHESIS OF LUMBAR REGION: ICD-10-CM

## 2025-08-21 DIAGNOSIS — M24.28 LIGAMENTUM FLAVUM HYPERTROPHY: ICD-10-CM

## 2025-08-21 DIAGNOSIS — M62.85 DYSFUNCTION OF THE MULTIFIDUS MUSCLE OF LUMBAR REGION: ICD-10-CM

## 2025-08-21 DIAGNOSIS — M48.062 LUMBAR STENOSIS WITH NEUROGENIC CLAUDICATION: ICD-10-CM

## 2025-08-21 DIAGNOSIS — Z96.651 STATUS POST TOTAL RIGHT KNEE REPLACEMENT: ICD-10-CM

## 2025-08-21 DIAGNOSIS — M47.816 SPONDYLOSIS OF LUMBAR REGION WITHOUT MYELOPATHY OR RADICULOPATHY: ICD-10-CM

## 2025-08-21 PROCEDURE — 99214 OFFICE O/P EST MOD 30 MIN: CPT | Performed by: ANESTHESIOLOGY

## 2025-08-21 PROCEDURE — 1125F AMNT PAIN NOTED PAIN PRSNT: CPT | Performed by: ANESTHESIOLOGY

## 2025-08-21 RX ORDER — PREDNISONE 5 MG/1
TABLET ORAL
COMMUNITY
Start: 2025-07-30

## 2025-08-26 ENCOUNTER — OFFICE VISIT (OUTPATIENT)
Dept: ORTHOPEDIC SURGERY | Facility: CLINIC | Age: 80
End: 2025-08-26
Payer: MEDICARE

## 2025-08-26 VITALS
HEIGHT: 65 IN | DIASTOLIC BLOOD PRESSURE: 74 MMHG | SYSTOLIC BLOOD PRESSURE: 122 MMHG | WEIGHT: 165 LBS | BODY MASS INDEX: 27.49 KG/M2

## 2025-08-26 DIAGNOSIS — M17.12 PRIMARY OSTEOARTHRITIS OF LEFT KNEE: Primary | ICD-10-CM

## 2025-08-26 RX ORDER — LIDOCAINE HYDROCHLORIDE 10 MG/ML
3 INJECTION, SOLUTION EPIDURAL; INFILTRATION; INTRACAUDAL; PERINEURAL
Status: COMPLETED | OUTPATIENT
Start: 2025-08-26 | End: 2025-08-26

## 2025-08-26 RX ORDER — DEXAMETHASONE SODIUM PHOSPHATE 4 MG/ML
4 INJECTION, SOLUTION INTRA-ARTICULAR; INTRALESIONAL; INTRAMUSCULAR; INTRAVENOUS; SOFT TISSUE
Status: COMPLETED | OUTPATIENT
Start: 2025-08-26 | End: 2025-08-26

## 2025-08-26 RX ADMIN — DEXAMETHASONE SODIUM PHOSPHATE 4 MG: 4 INJECTION, SOLUTION INTRA-ARTICULAR; INTRALESIONAL; INTRAMUSCULAR; INTRAVENOUS; SOFT TISSUE at 08:30

## 2025-08-26 RX ADMIN — LIDOCAINE HYDROCHLORIDE 3 ML: 10 INJECTION, SOLUTION EPIDURAL; INFILTRATION; INTRACAUDAL; PERINEURAL at 08:30

## (undated) DEVICE — SCRW HEX PERSONA FML 2.5X25MM PK/2
Type: IMPLANTABLE DEVICE | Status: NON-FUNCTIONAL
Removed: 2020-03-11

## (undated) DEVICE — NDL HYPO ECLPS SFTY 18G 1 1/2IN

## (undated) DEVICE — PUMP PAIN AUTOFUSER AUTO SELCT NOBOLUS 1TO14ML/HR 550ML DISP

## (undated) DEVICE — 3M™ STERI-DRAPE™ INCISE DRAPE 1050 (60CM X 45CM): Brand: STERI-DRAPE™

## (undated) DEVICE — MEDI-VAC YANKAUER SUCTION HANDLE W/BULBOUS TIP: Brand: CARDINAL HEALTH

## (undated) DEVICE — SPNG GZ WOVN 4X4IN 12PLY 10/BX STRL

## (undated) DEVICE — PIN DRL NOHEAD TROC 3.2X75MM

## (undated) DEVICE — DRSNG PAD ABD 8X10IN STRL

## (undated) DEVICE — PULLOVER TOGA, 2X LARGE: Brand: FLYTE, SURGICOOL

## (undated) DEVICE — SUT MONOCRYL PLS ANTIB UND 3/0  PS1 27IN

## (undated) DEVICE — STERILE PVP: Brand: MEDLINE INDUSTRIES, INC.

## (undated) DEVICE — COVER,LIGHT HANDLE,FLX,1/PK: Brand: MEDLINE INDUSTRIES, INC.

## (undated) DEVICE — Device

## (undated) DEVICE — ANTIBACTERIAL UNDYED BRAIDED (POLYGLACTIN 910), SYNTHETIC ABSORBABLE SUTURE: Brand: COATED VICRYL

## (undated) DEVICE — PK KN TOTL 10

## (undated) DEVICE — SYS SKIN CLS DERMABOND PRINEO W/22CM MESH TP

## (undated) DEVICE — 3 BONE CEMENT MIXER: Brand: MIXEVAC

## (undated) DEVICE — STRYKER PERFORMANCE SERIES SAGITTAL BLADE: Brand: STRYKER PERFORMANCE SERIES

## (undated) DEVICE — BNDG ELAS W/CLIP 6IN 10YD LF STRL

## (undated) DEVICE — PAD UNDERCAST WYTEX 6IN 4YD LF STRL

## (undated) DEVICE — SYR LUERLOK 30CC

## (undated) DEVICE — PULLOVER TOGA, X-LARGE: Brand: FLYTE, SURGICOOL

## (undated) DEVICE — GLV SURG PREMIERPRO MIC LTX PF SZ8 BRN